# Patient Record
Sex: FEMALE | Race: WHITE | NOT HISPANIC OR LATINO | Employment: FULL TIME | URBAN - METROPOLITAN AREA
[De-identification: names, ages, dates, MRNs, and addresses within clinical notes are randomized per-mention and may not be internally consistent; named-entity substitution may affect disease eponyms.]

---

## 2020-08-17 ENCOUNTER — OFFICE VISIT (OUTPATIENT)
Dept: INTERNAL MEDICINE | Facility: CLINIC | Age: 40
End: 2020-08-17

## 2020-08-17 DIAGNOSIS — Z20.822 SUSPECTED COVID-19 VIRUS INFECTION: Primary | ICD-10-CM

## 2020-08-17 DIAGNOSIS — R50.9 FEVER, UNSPECIFIED FEVER CAUSE: ICD-10-CM

## 2020-08-17 PROBLEM — E66.811 CLASS 1 OBESITY DUE TO EXCESS CALORIES WITH SERIOUS COMORBIDITY AND BODY MASS INDEX (BMI) OF 33.0 TO 33.9 IN ADULT: Status: ACTIVE | Noted: 2019-03-19

## 2020-08-17 PROBLEM — E66.09 CLASS 1 OBESITY DUE TO EXCESS CALORIES WITH SERIOUS COMORBIDITY AND BODY MASS INDEX (BMI) OF 33.0 TO 33.9 IN ADULT: Status: ACTIVE | Noted: 2019-03-19

## 2020-08-17 PROCEDURE — 99203 PR OFFICE/OUTPT VISIT, NEW, LEVL III, 30-44 MIN: ICD-10-PCS | Mod: 95,,, | Performed by: NURSE PRACTITIONER

## 2020-08-17 PROCEDURE — 99203 OFFICE O/P NEW LOW 30 MIN: CPT | Mod: 95,,, | Performed by: NURSE PRACTITIONER

## 2020-08-17 RX ORDER — SERTRALINE HYDROCHLORIDE 100 MG/1
1.5 TABLET, FILM COATED ORAL DAILY
COMMUNITY
Start: 2020-08-07 | End: 2022-11-07 | Stop reason: SDUPTHER

## 2020-08-17 RX ORDER — CLONAZEPAM 0.5 MG/1
0.5 TABLET ORAL 2 TIMES DAILY PRN
COMMUNITY
Start: 2020-04-24 | End: 2022-11-07 | Stop reason: SDUPTHER

## 2020-08-17 RX ORDER — IBUPROFEN 800 MG/1
800 TABLET ORAL 3 TIMES DAILY
Qty: 30 TABLET | Refills: 0 | Status: SHIPPED | OUTPATIENT
Start: 2020-08-17 | End: 2023-05-11 | Stop reason: ALTCHOICE

## 2020-08-17 RX ORDER — LURASIDONE HYDROCHLORIDE 60 MG/1
60 TABLET, FILM COATED ORAL DAILY
COMMUNITY
Start: 2020-07-30 | End: 2022-08-09 | Stop reason: DRUGHIGH

## 2020-08-17 NOTE — PATIENT INSTRUCTIONS
Instructions for Patients with Confirmed or Suspected COVID-19    If you are awaiting your test result, you will either be called or it will be released to the patient portal.  If you have any questions about your test, please visit [email or web address] or call our COVID-19 information line at [phone].      Instructions for non-hospitalized or discharged patients with confirmed or suspected COVID-19:       Stay home except to get medical care.    Separate yourself from other people and animals in your home.    Call ahead before visiting your doctor.    Wear a face mask.    Cover your coughs and sneezes.    Clean your hands often.    Avoid sharing personal household items.    Clean all high-touch surfaces every day.    Monitor your symptoms. Seek prompt medical attention if your illness is worsening (e.g., difficulty breathing). Before seeking care, call your healthcare provider.    If you have a medical emergency and must call 911, notify the dispatcher that you have or are being evaluated for COVID-19. If possible, put on a face mask before emergency medical services arrive.    Use the following symptom-based strategy to return to normal activity following a suspected or confirmed case of COVID-19. Continue isolation until:   o At least 3 days (72 hours) have passed since recovery defined as resolution of fever without the use of fever-reducing medications and improvement in respiratory symptoms (e.g. cough, shortness of breath), and   o At least 10 days have passed since the first positive test.       As one of the next steps, you will receive a call or text from the [place] Department of Health ([facility]) COVID-19 Tracing Team. See the contact information below so you know not to ignore the health departments call. It is important that you contact them back immediately so they can help.     Contact Tracer Number:  [phone]  Caller ID for most carriers: [place] Dept [facility]    What is  contact tracing?   Contact tracing is a process that helps identify everyone who has been in close contact with an infected person. Contact tracers let those people know they may have been exposed and guide them on next steps. Confidentiality is important for everyone; no one will be told who may have exposed them to the virus.   Your involvement is important. The more we know about where and how this virus is spreading, the better chance we have at stopping it from spreading further.  What does exposure mean?   Exposure means you have been within 6 feet for more than 15 minutes with a person who has or had COVID-19.  What kind of questions do the contact tracers ask?   A contact tracer will confirm your basic contact information including name, address, phone number, and next of kin, as well as asking about any symptoms you may have had. Theyll also ask you how you think you may have gotten sick, such as places where you may have been exposed to the virus, and people you were with. Those names will never be shared with anyone outside of that call, and will only be used to help trace and stop the spread of the virus.   I have privacy concerns. How will the state use my information?   Your privacy about your health is important. All calls are completed using call centers that use the appropriate health privacy protection measures (HIPAA compliance), meaning that your patient information is safe. No one will ever ask you any questions related to immigration status. Your health comes first.   Do I have to participate?   You do not have to participate, but we strongly encourage you to. Contact tracing can help us catch and control new outbreaks as theyre developing to keep your friends and family safe.   What if I dont hear from anyone?   If you dont receive a call within 24 hours, you can call the number above right away to inquire about your status. That line is open from 8:00 am - 8:00 p.m., 7 days a  week.  Contact tracing saves lives! Together, we have the power to beat this virus and keep our loved ones and neighbors safe.       Instructions for household members, intimate partners and caregivers in a non-healthcare setting of a patient with confirmed or suspected COVID-19:         Close contacts should monitor their health and call their healthcare provider right away if they develop symptoms suggestive of COVID-19 (e.g., fever, cough, shortness of breath).    Stay home except to get medical care. Separate yourself from other people and animals in the home.   Monitor the patients symptoms. If the patient is getting sicker, call his or her healthcare provider. If the patient has a medical emergency and you need to call 911, notify the dispatch personnel that the patient has or is being evaluated for COVID-19.    Wear a facemask when around other people such as sharing a room or vehicle and before entering a healthcare provider's office.   Cover coughs and sneezes with a tissue. Throw used tissues in a lined trash can immediately and wash hands.   Clean hands often with soap and water for at least 20 seconds or with an alcohol-based hand , rubbing hands together until they feel dry. Avoid touching your eyes, nose, and mouth with unwashed hands.   Clean all high-touch; surfaces every day, including counters, tabletops, doorknobs, bathroom fixtures, toilets, phones, keyboards, tablets, bedside tables, etc. Use a household cleaning spray or wipe according to label instructions.   Avoid sharing personal household items such as dishes, drinking glasses, cups, towels, bedding, etc. After these items are used, they should be washed thoroughly with soap and water.   Continue isolation until:   At least 3 days (72 hours) have passed since recovery defined as resolution of fever without the use of fever-reducing medications and improvement in respiratory symptoms (e.g. cough, shortness of breath),  and    At least 10 days have passed since the patients first positive test.    https://www.cdc.gov/coronavirus/2019-ncov/your-health/index.htm

## 2020-08-17 NOTE — ASSESSMENT & PLAN NOTE
Tylenol/ibuprofen for pain and fever.   Hand hygiene.   Maintain adequate hydration.   Antihistamine and flonase for nasal congestion and upper respiratory symptoms.   Rest.   Offered cough suppressant, pt declined for now. Discussed option for COVID testing, but will not change plan of care so pt opted out. Given COVID precautions.

## 2020-08-17 NOTE — PROGRESS NOTES
Subjective:       Patient ID: Maegan Lafleur is a 39 y.o. female.    Chief Complaint: No chief complaint on file.    The patient location is: [place]  The chief complaint leading to consultation is: Cough, fever, HA    Visit type: audiovisual    Face to Face time with patient: 12 minutes  12 minutes of total time spent on the encounter, which includes face to face time and non-face to face time preparing to see the patient (eg, review of tests), Obtaining and/or reviewing separately obtained history, Documenting clinical information in the electronic or other health record, Independently interpreting results (not separately reported) and communicating results to the patient/family/caregiver, or Care coordination (not separately reported).     Each patient to whom he or she provides medical services by telemedicine is:  (1) informed of the relationship between the physician and patient and the respective role of any other health care provider with respect to management of the patient; and (2) notified that he or she may decline to receive medical services by telemedicine and may withdraw from such care at any time.    Notes:   Mrs. Curtis presents to clinic with concerns for COVID. She started with fever, HA, and cough this morning, and recently exposed to COVID at birthday party this weekend.     Fever   This is a new problem. The current episode started today. The problem occurs constantly. The problem has been gradually worsening. The maximum temperature noted was 100 to 100.9 F. The temperature was taken using an oral thermometer. Associated symptoms include coughing and headaches. Pertinent negatives include no abdominal pain, chest pain, congestion, diarrhea, ear pain, muscle aches, nausea, rash, sleepiness, sore throat, urinary pain, vomiting or wheezing. She has tried fluids for the symptoms. The treatment provided no relief.   Risk factors: sick contacts        Patient Active Problem List    Diagnosis    Chronic anxiety    Class 1 obesity due to excess calories with serious comorbidity and body mass index (BMI) of 33.0 to 33.9 in adult    Severe episode of recurrent major depressive disorder, without psychotic features    Suspected Covid-19 Virus Infection       Family History   Problem Relation Age of Onset    No Known Problems Mother     Diabetes Father      History reviewed. No pertinent surgical history.      Current Outpatient Medications:     clonazePAM (KLONOPIN) 0.5 MG tablet, Take 0.5 mg by mouth 2 (two) times daily as needed., Disp: , Rfl:     lurasidone (LATUDA) 60 mg Tab tablet, Take 60 mg by mouth once daily., Disp: , Rfl:     ibuprofen (ADVIL,MOTRIN) 800 MG tablet, Take 1 tablet (800 mg total) by mouth 3 (three) times daily., Disp: 30 tablet, Rfl: 0    sertraline (ZOLOFT) 100 MG tablet, Take 1.5 mg by mouth Daily., Disp: , Rfl:     Review of Systems   Constitutional: Positive for fatigue and fever. Negative for chills.   HENT: Negative for congestion, ear pain, postnasal drip, rhinorrhea, sinus pressure, sinus pain and sore throat.    Respiratory: Positive for cough. Negative for shortness of breath and wheezing.    Cardiovascular: Negative for chest pain.   Gastrointestinal: Negative for abdominal pain, diarrhea, nausea and vomiting.   Genitourinary: Negative for dysuria, flank pain, frequency and urgency.   Musculoskeletal: Negative for back pain, gait problem and myalgias.   Skin: Negative for rash.   Neurological: Positive for headaches. Negative for dizziness and light-headedness.       Objective:   [facility] 07/06/2020 Comment: irregular     Physical Exam  Constitutional:       General: She is not in acute distress.     Appearance: Normal appearance. She is ill-appearing.   Cardiovascular:      Rate and Rhythm: Normal rate and regular rhythm.      Pulses: Normal pulses.      Heart sounds: Normal heart sounds. No murmur. No friction rub. No gallop.    Pulmonary:      Effort:  "Pulmonary effort is normal. No respiratory distress.      Breath sounds: Normal breath sounds.   Neurological:      Mental Status: She is alert.         Assessment & Plan     Problem List Items Addressed This Visit        Other    Suspected Covid-19 Virus Infection - Primary    Current Assessment & Plan     Tylenol/ibuprofen for pain and fever.   Hand hygiene.   Maintain adequate hydration.   Antihistamine and flonase for nasal congestion and upper respiratory symptoms.   Rest.   Offered cough suppressant, pt declined for now. Discussed option for COVID testing, but will not change plan of care so pt opted out. Given COVID precautions.            Other Visit Diagnoses     Fever, unspecified fever cause        Relevant Medications    ibuprofen (ADVIL,MOTRIN) 800 MG tablet        Follow up if symptoms worsen or fail to improve.            Portions of this note may have been created with voice recognition software. Occasional "wrong-word" or "sound-a-like" substitutions may have occurred due to the inherent limitations of voice recognition software. Please, read the note carefully and recognize, using context, where substitutions have occurred.       "

## 2021-04-29 ENCOUNTER — PATIENT MESSAGE (OUTPATIENT)
Dept: RESEARCH | Facility: HOSPITAL | Age: 41
End: 2021-04-29

## 2021-10-27 ENCOUNTER — HOSPITAL ENCOUNTER (EMERGENCY)
Facility: HOSPITAL | Age: 41
Discharge: HOME | End: 2021-10-27
Attending: EMERGENCY MEDICINE
Payer: MEDICAID

## 2021-10-27 VITALS
DIASTOLIC BLOOD PRESSURE: 87 MMHG | RESPIRATION RATE: 16 BRPM | WEIGHT: 204.13 LBS | OXYGEN SATURATION: 98 % | HEIGHT: 66 IN | TEMPERATURE: 98 F | BODY MASS INDEX: 32.81 KG/M2 | SYSTOLIC BLOOD PRESSURE: 145 MMHG | HEART RATE: 90 BPM

## 2021-10-27 DIAGNOSIS — J06.9 VIRAL URI WITH COUGH: Primary | ICD-10-CM

## 2021-10-27 LAB
CTP QC/QA: YES
GROUP A STREP, MOLECULAR: NEGATIVE
SARS-COV-2 RDRP RESP QL NAA+PROBE: NEGATIVE

## 2021-10-27 PROCEDURE — U0002 COVID-19 LAB TEST NON-CDC: HCPCS | Mod: ER | Performed by: REGISTERED NURSE

## 2021-10-27 PROCEDURE — 87651 STREP A DNA AMP PROBE: CPT | Mod: ER | Performed by: REGISTERED NURSE

## 2021-10-27 PROCEDURE — 99283 EMERGENCY DEPT VISIT LOW MDM: CPT | Mod: 25,ER

## 2021-10-27 RX ORDER — DEXAMETHASONE 4 MG/1
4 TABLET ORAL DAILY
Qty: 4 TABLET | Refills: 0 | Status: SHIPPED | OUTPATIENT
Start: 2021-10-27 | End: 2021-10-31

## 2021-12-17 ENCOUNTER — HOSPITAL ENCOUNTER (EMERGENCY)
Facility: HOSPITAL | Age: 41
Discharge: HOME | End: 2021-12-17
Attending: EMERGENCY MEDICINE
Payer: MEDICAID

## 2021-12-17 VITALS
WEIGHT: 200.19 LBS | TEMPERATURE: 100 F | BODY MASS INDEX: 32.31 KG/M2 | RESPIRATION RATE: 18 BRPM | SYSTOLIC BLOOD PRESSURE: 141 MMHG | HEART RATE: 116 BPM | DIASTOLIC BLOOD PRESSURE: 70 MMHG | OXYGEN SATURATION: 96 %

## 2021-12-17 DIAGNOSIS — B34.9 VIRAL ILLNESS: Primary | ICD-10-CM

## 2021-12-17 LAB
CTP QC/QA: YES
CTP QC/QA: YES
GROUP A STREP, MOLECULAR: NEGATIVE
POC MOLECULAR INFLUENZA A AGN: NEGATIVE
POC MOLECULAR INFLUENZA B AGN: NEGATIVE
SARS-COV-2 RDRP RESP QL NAA+PROBE: NEGATIVE

## 2021-12-17 PROCEDURE — 87651 STREP A DNA AMP PROBE: CPT | Mod: ER | Performed by: NURSE PRACTITIONER

## 2021-12-17 PROCEDURE — U0002 COVID-19 LAB TEST NON-CDC: HCPCS | Mod: ER | Performed by: NURSE PRACTITIONER

## 2021-12-17 PROCEDURE — 99282 EMERGENCY DEPT VISIT SF MDM: CPT | Mod: 25,ER

## 2022-07-05 ENCOUNTER — HOSPITAL ENCOUNTER (EMERGENCY)
Facility: HOSPITAL | Age: 42
Discharge: HOME | End: 2022-07-05
Attending: EMERGENCY MEDICINE
Payer: MEDICAID

## 2022-07-05 VITALS
SYSTOLIC BLOOD PRESSURE: 132 MMHG | HEART RATE: 95 BPM | OXYGEN SATURATION: 98 % | WEIGHT: 203.63 LBS | TEMPERATURE: 99 F | DIASTOLIC BLOOD PRESSURE: 87 MMHG | RESPIRATION RATE: 18 BRPM | HEIGHT: 66 IN | BODY MASS INDEX: 32.72 KG/M2

## 2022-07-05 DIAGNOSIS — Z20.822 COVID-19 VIRUS NOT DETECTED: Primary | ICD-10-CM

## 2022-07-05 LAB
CTP QC/QA: YES
SARS-COV-2 RDRP RESP QL NAA+PROBE: NEGATIVE

## 2022-07-05 PROCEDURE — 99282 EMERGENCY DEPT VISIT SF MDM: CPT | Mod: ER

## 2022-07-05 PROCEDURE — U0002 COVID-19 LAB TEST NON-CDC: HCPCS | Mod: ER | Performed by: EMERGENCY MEDICINE

## 2022-07-05 NOTE — ED PROVIDER NOTES
Encounter Date: 7/5/2022       History     Chief Complaint   Patient presents with    COVID-19 Concerns     C/o spouse in same household being COVID+. Denies symptoms.      Maegan Lafleur is a 41 y.o. female who presents for screening for COVID-19 for work, which is a  facility, in the setting of recent exposure to her  who has COVID-19.     Problem onset: now/upon arrival  Severity: minimal  Setting: at home  Associated symptoms: none         Review of patient's allergies indicates:   Allergen Reactions    Escitalopram oxalate Swelling     No past medical history on file.  No past surgical history on file.  Family History   Problem Relation Age of Onset    No Known Problems Mother     Diabetes Father      Social History     Tobacco Use    Smoking status: Never Smoker    Smokeless tobacco: Never Used   Substance Use Topics    Alcohol use: Not Currently    Drug use: Not Currently     Review of Systems   Constitutional: Negative.  Negative for fever.   HENT: Negative.    Eyes: Negative.    Respiratory: Negative.    Cardiovascular: Negative.    Gastrointestinal: Negative.    Endocrine: Negative.    Genitourinary: Negative.    Musculoskeletal: Negative.    Skin: Negative.    Allergic/Immunologic: Negative.    Neurological: Negative.    Hematological: Negative.    Psychiatric/Behavioral: Negative.    All other systems reviewed and are negative.      Physical Exam     Initial Vitals [07/05/22 0244]   BP Pulse Resp Temp SpO2   132/87 95 18 98.8 °F (37.1 °C) 98 %      MAP       --         Physical Exam    Nursing note and vitals reviewed.  Constitutional: She appears well-developed and well-nourished. No distress.   HENT:   Head: Normocephalic and atraumatic.   Eyes: Conjunctivae and EOM are normal.   Neck: Neck supple.   Cardiovascular: Normal rate.   Pulmonary/Chest: No respiratory distress.   Abdominal: She exhibits no distension.   Musculoskeletal:         General: Normal range of motion.       Cervical back: Neck supple.     Neurological: She is alert and oriented to person, place, and time. She has normal strength. GCS score is 15. GCS eye subscore is 4. GCS verbal subscore is 5. GCS motor subscore is 6.   Skin: No rash noted.   Psychiatric: She has a normal mood and affect.         ED Course   Procedures  Labs Reviewed   SARS-COV-2 RDRP GENE    Narrative:     This test utilizes isothermal nucleic acid amplification   technology to detect the SARS-CoV-2 RdRp nucleic acid segment.   The analytical sensitivity (limit of detection) is 125 genome   equivalents/mL.   A POSITIVE result implies infection with the SARS-CoV-2 virus;   the patient is presumed to be contagious.     A NEGATIVE result means that SARS-CoV-2 nucleic acids are not   present above the limit of detection. A NEGATIVE result should be   treated as presumptive. It does not rule out the possibility of   COVID-19 and should not be the sole basis for treatment decisions.   If COVID-19 is strongly suspected based on clinical and exposure   history, re-testing using an alternate molecular assay should be   considered.   This test is only for use under the Food and Drug   Administration s Emergency Use Authorization (EUA).   Commercial kits are provided by [employer].   Performance characteristics of the EUA have been independently   verified by [facility] Department of   Pathology and Laboratory Medicine.   _________________________________________________________________   The authorized Fact Sheet for Healthcare Providers and the authorized Fact   Sheet for Patients of the ID NOW COVID-19 are available on the FDA   website:     https://www.fda.gov/media/486920/download  https://www.fda.gov/media/713727/download                  Imaging Results    None          Medications - No data to display                  Recent Results (from the past 24 hour(s))   POCT COVID-19 Rapid Screening    Collection Time: 07/05/22  3:06 AM    Result Value Ref Range    POC Rapid COVID Negative Negative     Acceptable Yes        Patient's evaluation in the ED does not suggest any emergent or life threatening medical conditions requiring immediate intervention beyond what was provided in the ED, and I believe patient is safe for discharge.  Regardless, an unremarkable evaluation in the ED does not preclude the development or presence of a serious or life threatening condition. As such, patient was given return instructions for any change or worsening in symptoms.         Clinical Impression:   Final diagnoses:  [Z20.822] COVID-19 virus not detected (Primary)          ED Disposition Condition    Discharge Stable        ED Prescriptions     None        Follow-up Information     Follow up With Specialties Details Why Contact Info    Cam Dowd MD Internal Medicine Schedule an appointment as soon as possible for a visit  As needed [id number] [facility]  [place]   [phone]             Ronal Cartagena MD  07/05/22 1752

## 2022-08-09 ENCOUNTER — OFFICE VISIT (OUTPATIENT)
Dept: INTERNAL MEDICINE | Facility: CLINIC | Age: 42
End: 2022-08-09
Payer: MEDICAID

## 2022-08-09 ENCOUNTER — LAB VISIT (OUTPATIENT)
Dept: LAB | Facility: HOSPITAL | Age: 42
End: 2022-08-09
Attending: NURSE PRACTITIONER
Payer: MEDICAID

## 2022-08-09 VITALS
WEIGHT: 206.38 LBS | TEMPERATURE: 98 F | HEIGHT: 66 IN | BODY MASS INDEX: 33.17 KG/M2 | DIASTOLIC BLOOD PRESSURE: 90 MMHG | HEART RATE: 116 BPM | SYSTOLIC BLOOD PRESSURE: 140 MMHG | OXYGEN SATURATION: 96 %

## 2022-08-09 DIAGNOSIS — F31.4 BIPOLAR DISORDER WITH SEVERE DEPRESSION: Primary | ICD-10-CM

## 2022-08-09 DIAGNOSIS — Z13.220 LIPID SCREENING: ICD-10-CM

## 2022-08-09 DIAGNOSIS — F33.2 SEVERE EPISODE OF RECURRENT MAJOR DEPRESSIVE DISORDER, WITHOUT PSYCHOTIC FEATURES: ICD-10-CM

## 2022-08-09 DIAGNOSIS — L65.9 HAIR THINNING: ICD-10-CM

## 2022-08-09 DIAGNOSIS — E66.09 CLASS 1 OBESITY DUE TO EXCESS CALORIES WITH SERIOUS COMORBIDITY AND BODY MASS INDEX (BMI) OF 33.0 TO 33.9 IN ADULT: ICD-10-CM

## 2022-08-09 DIAGNOSIS — R53.83 FATIGUE, UNSPECIFIED TYPE: ICD-10-CM

## 2022-08-09 DIAGNOSIS — R63.5 WEIGHT GAIN: ICD-10-CM

## 2022-08-09 DIAGNOSIS — F41.9 CHRONIC ANXIETY: ICD-10-CM

## 2022-08-09 LAB
ALBUMIN SERPL BCP-MCNC: 4 G/DL (ref 3.5–5.2)
ALP SERPL-CCNC: 100 U/L (ref 55–135)
ALT SERPL W/O P-5'-P-CCNC: 56 U/L (ref 10–44)
ANION GAP SERPL CALC-SCNC: 7 MMOL/L (ref 8–16)
AST SERPL-CCNC: 32 U/L (ref 10–40)
BASOPHILS # BLD AUTO: 0.06 K/UL (ref 0–0.2)
BASOPHILS NFR BLD: 0.6 % (ref 0–1.9)
BILIRUB SERPL-MCNC: 0.3 MG/DL (ref 0.1–1)
BUN SERPL-MCNC: 12 MG/DL (ref 6–20)
CALCIUM SERPL-MCNC: 10.1 MG/DL (ref 8.7–10.5)
CHLORIDE SERPL-SCNC: 101 MMOL/L (ref 95–110)
CO2 SERPL-SCNC: 30 MMOL/L (ref 23–29)
CREAT SERPL-MCNC: 0.9 MG/DL (ref 0.5–1.4)
DIFFERENTIAL METHOD: ABNORMAL
EOSINOPHIL # BLD AUTO: 0.3 K/UL (ref 0–0.5)
EOSINOPHIL NFR BLD: 2.5 % (ref 0–8)
ERYTHROCYTE [DISTWIDTH] IN BLOOD BY AUTOMATED COUNT: 14.8 % (ref 11.5–14.5)
EST. GFR  (NO RACE VARIABLE): >60 ML/MIN/1.73 M^2
GLUCOSE SERPL-MCNC: 129 MG/DL (ref 70–110)
HCT VFR BLD AUTO: 36.9 % (ref 37–48.5)
HGB BLD-MCNC: 12 G/DL (ref 12–16)
IMM GRANULOCYTES # BLD AUTO: 0.04 K/UL (ref 0–0.04)
IMM GRANULOCYTES NFR BLD AUTO: 0.4 % (ref 0–0.5)
LYMPHOCYTES # BLD AUTO: 3 K/UL (ref 1–4.8)
LYMPHOCYTES NFR BLD: 28.1 % (ref 18–48)
MCH RBC QN AUTO: 26.3 PG (ref 27–31)
MCHC RBC AUTO-ENTMCNC: 32.5 G/DL (ref 32–36)
MCV RBC AUTO: 81 FL (ref 82–98)
MONOCYTES # BLD AUTO: 0.6 K/UL (ref 0.3–1)
MONOCYTES NFR BLD: 5.7 % (ref 4–15)
NEUTROPHILS # BLD AUTO: 6.7 K/UL (ref 1.8–7.7)
NEUTROPHILS NFR BLD: 62.7 % (ref 38–73)
NRBC BLD-RTO: 0 /100 WBC
PLATELET # BLD AUTO: 477 K/UL (ref 150–450)
PMV BLD AUTO: 8.2 FL (ref 9.2–12.9)
POTASSIUM SERPL-SCNC: 3.8 MMOL/L (ref 3.5–5.1)
PROT SERPL-MCNC: 8.1 G/DL (ref 6–8.4)
RBC # BLD AUTO: 4.57 M/UL (ref 4–5.4)
SODIUM SERPL-SCNC: 138 MMOL/L (ref 136–145)
TSH SERPL DL<=0.005 MIU/L-ACNC: 1.9 UIU/ML (ref 0.4–4)
WBC # BLD AUTO: 10.6 K/UL (ref 3.9–12.7)

## 2022-08-09 PROCEDURE — 99214 PR OFFICE/OUTPT VISIT, EST, LEVL IV, 30-39 MIN: ICD-10-PCS | Mod: S$PBB,,, | Performed by: NURSE PRACTITIONER

## 2022-08-09 PROCEDURE — 99214 OFFICE O/P EST MOD 30 MIN: CPT | Mod: S$PBB,,, | Performed by: NURSE PRACTITIONER

## 2022-08-09 PROCEDURE — 3044F PR MOST RECENT HEMOGLOBIN A1C LEVEL <7.0%: ICD-10-PCS | Mod: CPTII,,, | Performed by: NURSE PRACTITIONER

## 2022-08-09 PROCEDURE — 3080F PR MOST RECENT DIASTOLIC BLOOD PRESSURE >= 90 MM HG: ICD-10-PCS | Mod: CPTII,,, | Performed by: NURSE PRACTITIONER

## 2022-08-09 PROCEDURE — 3008F PR BODY MASS INDEX (BMI) DOCUMENTED: ICD-10-PCS | Mod: CPTII,,, | Performed by: NURSE PRACTITIONER

## 2022-08-09 PROCEDURE — 99999 PR PBB SHADOW E&M-EST. PATIENT-LVL III: ICD-10-PCS | Mod: PBBFAC,,, | Performed by: NURSE PRACTITIONER

## 2022-08-09 PROCEDURE — 3044F HG A1C LEVEL LT 7.0%: CPT | Mod: CPTII,,, | Performed by: NURSE PRACTITIONER

## 2022-08-09 PROCEDURE — 99999 PR PBB SHADOW E&M-EST. PATIENT-LVL III: CPT | Mod: PBBFAC,,, | Performed by: NURSE PRACTITIONER

## 2022-08-09 PROCEDURE — 83036 HEMOGLOBIN GLYCOSYLATED A1C: CPT | Performed by: NURSE PRACTITIONER

## 2022-08-09 PROCEDURE — 80061 LIPID PANEL: CPT | Performed by: NURSE PRACTITIONER

## 2022-08-09 PROCEDURE — 80053 COMPREHEN METABOLIC PANEL: CPT | Mod: PO | Performed by: NURSE PRACTITIONER

## 2022-08-09 PROCEDURE — 85025 COMPLETE CBC W/AUTO DIFF WBC: CPT | Mod: PO | Performed by: NURSE PRACTITIONER

## 2022-08-09 PROCEDURE — 3008F BODY MASS INDEX DOCD: CPT | Mod: CPTII,,, | Performed by: NURSE PRACTITIONER

## 2022-08-09 PROCEDURE — 3080F DIAST BP >= 90 MM HG: CPT | Mod: CPTII,,, | Performed by: NURSE PRACTITIONER

## 2022-08-09 PROCEDURE — 36415 COLL VENOUS BLD VENIPUNCTURE: CPT | Mod: PO | Performed by: NURSE PRACTITIONER

## 2022-08-09 PROCEDURE — 3077F PR MOST RECENT SYSTOLIC BLOOD PRESSURE >= 140 MM HG: ICD-10-PCS | Mod: CPTII,,, | Performed by: NURSE PRACTITIONER

## 2022-08-09 PROCEDURE — 3077F SYST BP >= 140 MM HG: CPT | Mod: CPTII,,, | Performed by: NURSE PRACTITIONER

## 2022-08-09 PROCEDURE — 99213 OFFICE O/P EST LOW 20 MIN: CPT | Mod: PBBFAC,PO | Performed by: NURSE PRACTITIONER

## 2022-08-09 PROCEDURE — 84443 ASSAY THYROID STIM HORMONE: CPT | Mod: PO | Performed by: NURSE PRACTITIONER

## 2022-08-09 RX ORDER — LURASIDONE HYDROCHLORIDE 20 MG/1
20 TABLET, FILM COATED ORAL DAILY
Qty: 30 TABLET | Refills: 5 | Status: SHIPPED | OUTPATIENT
Start: 2022-08-09 | End: 2022-08-24

## 2022-08-09 RX ORDER — LURASIDONE HYDROCHLORIDE 20 MG/1
1 TABLET, FILM COATED ORAL DAILY
COMMUNITY
Start: 2021-09-24 | End: 2022-08-09 | Stop reason: SDUPTHER

## 2022-08-09 NOTE — PROGRESS NOTES
Subjective:       Patient ID: Maegan Lafleur is a 41 y.o. female.    Chief Complaint: Depression    Mrs. Lafleur presents to visit to establish care and for complaint of depression/bipoar. Currently on latuda and zoloft. Previously managed by Dr Dowd. Previously was on latuda, felt that it worked in the past, but recently when started again did not feel that it works as well. Recently went to Dr. Archer with [facility]. Had nausea with 20 mg of latuda so has only been taking 0.5 tablet. Previous records reviewed, noted that she was on 40 mg latuda in 2018. During bipolar episodes, reports that she spends money in excess to extent that  has to take debit/credit cards away from her. Has been tearful and crying. No desire to do things. Also expresses concerns of weight gain, hair thinning, and general fatigue.     risperadol: made symptoms worse  Abilify: psychotic thoughts  Prozac: did not work  Seroquel: unknown issues      Also has been having urinary incontinence with sneezing and urgency for 2+ years. Saw GYN last year.       Patient Active Problem List   Diagnosis    Chronic anxiety    Class 1 obesity due to excess calories with serious comorbidity and body mass index (BMI) of 33.0 to 33.9 in adult    Severe episode of recurrent major depressive disorder, without psychotic features    Bipolar disorder with severe depression    Fatigue       Family History   Problem Relation Age of Onset    No Known Problems Mother     Diabetes Father      History reviewed. No pertinent surgical history.        Review of Systems   Constitutional:  Positive for activity change and fatigue. Negative for appetite change, chills and fever.   Eyes:  Negative for visual disturbance.   Respiratory:  Negative for shortness of breath.    Cardiovascular:  Negative for chest pain and palpitations.   Gastrointestinal:  Negative for abdominal pain, constipation, diarrhea, nausea and vomiting.   Genitourinary:  Negative for  "frequency and urgency.        Urge/stress incontinence   Neurological:  Negative for dizziness, light-headedness and headaches.   Psychiatric/Behavioral:  Positive for agitation, decreased concentration and dysphoric mood. Negative for self-injury and suicidal ideas. The patient is nervous/anxious.      Objective:   BP (!) 140/90 (BP Location: Left arm, Patient Position: Sitting, BP Method: Medium (Manual))   Pulse (!) 116   Temp 97.7 °F (36.5 °C) (Temporal)   Ht 5' 6" (1.676 m)   Wt 93.6 kg (206 lb 5.6 oz)   LMP 08/07/2022   SpO2 96%   BMI 33.31 kg/m²      Physical Exam  Constitutional:       General: She is not in acute distress.     Appearance: Normal appearance. She is obese. She is not ill-appearing.   Cardiovascular:      Rate and Rhythm: Regular rhythm. Tachycardia present.      Pulses: Normal pulses.      Heart sounds: Normal heart sounds. No murmur heard.    No friction rub. No gallop.   Pulmonary:      Effort: Pulmonary effort is normal. No respiratory distress.      Breath sounds: Normal breath sounds.   Abdominal:      Palpations: Abdomen is soft.      Tenderness: There is no abdominal tenderness. There is no guarding.   Skin:     General: Skin is warm and dry.      Coloration: Skin is not pale.      Findings: No erythema.   Neurological:      Mental Status: She is alert and oriented to person, place, and time.   Psychiatric:         Mood and Affect: Mood is anxious and depressed. Affect is tearful.         Speech: Speech normal.         Behavior: Behavior normal. Behavior is cooperative.         Thought Content: Thought content normal.       Assessment & Plan     Problem List Items Addressed This Visit          Psychiatric    Chronic anxiety    Current Assessment & Plan     Continue zoloft. Klonopin prn.          Severe episode of recurrent major depressive disorder, without psychotic features    Current Assessment & Plan     Trying to increase latuda for now. Concerns about cost or coverage " "since she has been receiving sample from psychiatry. Follow up in two weeks.          Bipolar disorder with severe depression - Primary    Current Assessment & Plan     Trying to increase latuda for now. Concerns about cost or coverage since she has been receiving sample from psychiatry. Follow up in two weeks.             Endocrine    Class 1 obesity due to excess calories with serious comorbidity and body mass index (BMI) of 33.0 to 33.9 in adult    Current Assessment & Plan     Counseled on importance of diet and exercise in order to improve overall quality of life, and reduce risk of future comorbidities.           Relevant Orders    TSH (Completed)    Hemoglobin A1C (Completed)       Other    Fatigue    Current Assessment & Plan     No concerning findings on physical exam outside of depression. Will do labs today.          Relevant Orders    CBC Auto Differential (Completed)    COMPREHENSIVE METABOLIC PANEL (Completed)    TSH (Completed)     Other Visit Diagnoses       Hair thinning        Relevant Orders    TSH (Completed)    Weight gain        Lipid screening        Relevant Orders    Lipid Panel (Completed)        trial of increasing latuda since she was previously able to titrate up to 40 mg with improvement of bipolar symptoms. Routine labs today. No concerning findings on physical exam.       Follow up in about 2 weeks (around 8/23/2022) for hypertension.            Portions of this note may have been created with voice recognition software. Occasional "wrong-word" or "sound-a-like" substitutions may have occurred due to the inherent limitations of voice recognition software. Please, read the note carefully and recognize, using context, where substitutions have occurred.       "

## 2022-08-10 LAB
CHOLEST SERPL-MCNC: 238 MG/DL (ref 120–199)
CHOLEST/HDLC SERPL: 4.3 {RATIO} (ref 2–5)
ESTIMATED AVG GLUCOSE: 117 MG/DL (ref 68–131)
HBA1C MFR BLD: 5.7 % (ref 4–5.6)
HDLC SERPL-MCNC: 55 MG/DL (ref 40–75)
HDLC SERPL: 23.1 % (ref 20–50)
LDLC SERPL CALC-MCNC: 143.8 MG/DL (ref 63–159)
NONHDLC SERPL-MCNC: 183 MG/DL
TRIGL SERPL-MCNC: 196 MG/DL (ref 30–150)

## 2022-08-15 ENCOUNTER — PATIENT MESSAGE (OUTPATIENT)
Dept: INTERNAL MEDICINE | Facility: CLINIC | Age: 42
End: 2022-08-15
Payer: MEDICAID

## 2022-08-23 ENCOUNTER — PATIENT MESSAGE (OUTPATIENT)
Dept: INTERNAL MEDICINE | Facility: CLINIC | Age: 42
End: 2022-08-23
Payer: MEDICAID

## 2022-08-24 ENCOUNTER — PATIENT MESSAGE (OUTPATIENT)
Dept: INTERNAL MEDICINE | Facility: CLINIC | Age: 42
End: 2022-08-24
Payer: MEDICAID

## 2022-08-24 RX ORDER — CARIPRAZINE 1.5 MG/1
1.5 CAPSULE, GELATIN COATED ORAL DAILY
Qty: 30 CAPSULE | Refills: 0 | Status: SHIPPED | OUTPATIENT
Start: 2022-08-24 | End: 2022-08-29

## 2022-08-28 PROBLEM — Z20.822 SUSPECTED COVID-19 VIRUS INFECTION: Status: RESOLVED | Noted: 2020-08-17 | Resolved: 2022-08-28

## 2022-08-28 PROBLEM — R53.83 FATIGUE: Status: ACTIVE | Noted: 2022-08-28

## 2022-08-28 PROBLEM — F31.4 BIPOLAR DISORDER WITH SEVERE DEPRESSION: Status: ACTIVE | Noted: 2022-08-28

## 2022-08-29 ENCOUNTER — OFFICE VISIT (OUTPATIENT)
Dept: INTERNAL MEDICINE | Facility: CLINIC | Age: 42
End: 2022-08-29
Payer: MEDICAID

## 2022-08-29 VITALS
HEART RATE: 95 BPM | OXYGEN SATURATION: 96 % | SYSTOLIC BLOOD PRESSURE: 130 MMHG | DIASTOLIC BLOOD PRESSURE: 78 MMHG | BODY MASS INDEX: 33.2 KG/M2 | WEIGHT: 205.69 LBS | TEMPERATURE: 97 F

## 2022-08-29 DIAGNOSIS — F33.2 SEVERE EPISODE OF RECURRENT MAJOR DEPRESSIVE DISORDER, WITHOUT PSYCHOTIC FEATURES: ICD-10-CM

## 2022-08-29 DIAGNOSIS — F41.9 CHRONIC ANXIETY: ICD-10-CM

## 2022-08-29 DIAGNOSIS — R73.03 PREDIABETES: ICD-10-CM

## 2022-08-29 DIAGNOSIS — F31.4 BIPOLAR DISORDER WITH SEVERE DEPRESSION: Primary | ICD-10-CM

## 2022-08-29 PROCEDURE — 99214 OFFICE O/P EST MOD 30 MIN: CPT | Mod: S$PBB,,, | Performed by: NURSE PRACTITIONER

## 2022-08-29 PROCEDURE — 3075F PR MOST RECENT SYSTOLIC BLOOD PRESS GE 130-139MM HG: ICD-10-PCS | Mod: CPTII,,, | Performed by: NURSE PRACTITIONER

## 2022-08-29 PROCEDURE — 3008F BODY MASS INDEX DOCD: CPT | Mod: CPTII,,, | Performed by: NURSE PRACTITIONER

## 2022-08-29 PROCEDURE — 3078F DIAST BP <80 MM HG: CPT | Mod: CPTII,,, | Performed by: NURSE PRACTITIONER

## 2022-08-29 PROCEDURE — 99999 PR PBB SHADOW E&M-EST. PATIENT-LVL III: CPT | Mod: PBBFAC,,, | Performed by: NURSE PRACTITIONER

## 2022-08-29 PROCEDURE — 1160F PR REVIEW ALL MEDS BY PRESCRIBER/CLIN PHARMACIST DOCUMENTED: ICD-10-PCS | Mod: CPTII,,, | Performed by: NURSE PRACTITIONER

## 2022-08-29 PROCEDURE — 1159F MED LIST DOCD IN RCRD: CPT | Mod: CPTII,,, | Performed by: NURSE PRACTITIONER

## 2022-08-29 PROCEDURE — 3008F PR BODY MASS INDEX (BMI) DOCUMENTED: ICD-10-PCS | Mod: CPTII,,, | Performed by: NURSE PRACTITIONER

## 2022-08-29 PROCEDURE — 3078F PR MOST RECENT DIASTOLIC BLOOD PRESSURE < 80 MM HG: ICD-10-PCS | Mod: CPTII,,, | Performed by: NURSE PRACTITIONER

## 2022-08-29 PROCEDURE — 3044F PR MOST RECENT HEMOGLOBIN A1C LEVEL <7.0%: ICD-10-PCS | Mod: CPTII,,, | Performed by: NURSE PRACTITIONER

## 2022-08-29 PROCEDURE — 3075F SYST BP GE 130 - 139MM HG: CPT | Mod: CPTII,,, | Performed by: NURSE PRACTITIONER

## 2022-08-29 PROCEDURE — 99213 OFFICE O/P EST LOW 20 MIN: CPT | Mod: PBBFAC,PO | Performed by: NURSE PRACTITIONER

## 2022-08-29 PROCEDURE — 1160F RVW MEDS BY RX/DR IN RCRD: CPT | Mod: CPTII,,, | Performed by: NURSE PRACTITIONER

## 2022-08-29 PROCEDURE — 1159F PR MEDICATION LIST DOCUMENTED IN MEDICAL RECORD: ICD-10-PCS | Mod: CPTII,,, | Performed by: NURSE PRACTITIONER

## 2022-08-29 PROCEDURE — 99999 PR PBB SHADOW E&M-EST. PATIENT-LVL III: ICD-10-PCS | Mod: PBBFAC,,, | Performed by: NURSE PRACTITIONER

## 2022-08-29 PROCEDURE — 3044F HG A1C LEVEL LT 7.0%: CPT | Mod: CPTII,,, | Performed by: NURSE PRACTITIONER

## 2022-08-29 PROCEDURE — 99214 PR OFFICE/OUTPT VISIT, EST, LEVL IV, 30-39 MIN: ICD-10-PCS | Mod: S$PBB,,, | Performed by: NURSE PRACTITIONER

## 2022-08-29 RX ORDER — CARIPRAZINE 1.5 MG/1
1.5 CAPSULE, GELATIN COATED ORAL DAILY
Qty: 30 CAPSULE | Refills: 0 | Status: SHIPPED | OUTPATIENT
Start: 2022-08-29 | End: 2022-11-02

## 2022-08-29 NOTE — ASSESSMENT & PLAN NOTE
Trying to increase latuda for now. Concerns about cost or coverage since she has been receiving sample from psychiatry. Follow up in two weeks.

## 2022-08-29 NOTE — PROGRESS NOTES
Subjective:       Patient ID: Maegan Lafleur is a 41 y.o. female.    Chief Complaint: Follow-up and Depression    Mrs Lafleur presents to visit for depression follow up. Has not had any improvement in depression recently, but also has not been able to get latuda approved since last visit. Changed rx to vraylar, but patient was not aware that it was at pharmacy. She reports that she has been having significant crying episodes and not wanting to get out of house. Has tried to start exercising to help with mood.     Depression  Visit Type: follow-up  Patient presents with the following symptoms: anhedonia, decreased concentration, depressed mood, excessive worry, fatigue, feelings of hopelessness, irritability, malaise and nervousness/anxiety.  Patient is not experiencing: palpitations, shortness of breath and suicidal ideas.   Severity: interfering with daily activities       Patient Active Problem List   Diagnosis    Chronic anxiety    Class 1 obesity due to excess calories with serious comorbidity and body mass index (BMI) of 33.0 to 33.9 in adult    Severe episode of recurrent major depressive disorder, without psychotic features    Bipolar disorder with severe depression    Fatigue    Prediabetes       Family History   Problem Relation Age of Onset    No Known Problems Mother     Diabetes Father      History reviewed. No pertinent surgical history.      Current Outpatient Medications:     clonazePAM (KLONOPIN) 0.5 MG tablet, Take 0.5 mg by mouth 2 (two) times daily as needed., Disp: , Rfl:     ibuprofen (ADVIL,MOTRIN) 800 MG tablet, Take 1 tablet (800 mg total) by mouth 3 (three) times daily., Disp: 30 tablet, Rfl: 0    sertraline (ZOLOFT) 100 MG tablet, Take 1.5 mg by mouth Daily., Disp: , Rfl:     cariprazine (VRAYLAR) 1.5 mg Cap, Take 1 capsule (1.5 mg total) by mouth once daily., Disp: 30 capsule, Rfl: 0    Review of Systems   Constitutional:  Positive for activity change, fatigue and irritability.  Negative for appetite change, chills and fever.   Eyes:  Negative for visual disturbance.   Respiratory:  Negative for shortness of breath.    Cardiovascular:  Negative for chest pain and palpitations.   Gastrointestinal:  Negative for abdominal pain, constipation, diarrhea, nausea and vomiting.   Genitourinary:  Negative for frequency and urgency.   Neurological:  Negative for dizziness, light-headedness and headaches.   Psychiatric/Behavioral:  Positive for agitation, decreased concentration, depression and dysphoric mood. Negative for self-injury and suicidal ideas. The patient is nervous/anxious.      Objective:   /78 (BP Location: Left arm, Patient Position: Sitting)   Pulse 95   Temp 97.2 °F (36.2 °C)   Wt 93.3 kg (205 lb 11 oz)   LMP 08/07/2022   SpO2 96%   BMI 33.20 kg/m²      Physical Exam  Constitutional:       General: She is not in acute distress.     Appearance: Normal appearance. She is obese. She is not ill-appearing.   Cardiovascular:      Rate and Rhythm: Normal rate and regular rhythm.      Pulses: Normal pulses.      Heart sounds: Normal heart sounds. No murmur heard.    No friction rub. No gallop.   Pulmonary:      Effort: Pulmonary effort is normal. No respiratory distress.      Breath sounds: Normal breath sounds.   Skin:     General: Skin is warm and dry.      Coloration: Skin is not pale.      Findings: No erythema.   Neurological:      Mental Status: She is alert and oriented to person, place, and time.   Psychiatric:         Mood and Affect: Mood is anxious and depressed. Affect is tearful.         Speech: Speech normal.         Behavior: Behavior normal. Behavior is cooperative.         Thought Content: Thought content does not include suicidal ideation. Thought content does not include suicidal plan.       Assessment & Plan     Problem List Items Addressed This Visit          Psychiatric    Chronic anxiety    Current Assessment & Plan     Continue zoloft. Klonopin prn.          "Severe episode of recurrent major depressive disorder, without psychotic features    Current Assessment & Plan     Not able to get latuda covered. Starting vraylar.          Bipolar disorder with severe depression - Primary    Current Assessment & Plan     Continue zoloft for now. Adding vraylar.          Relevant Medications    cariprazine (VRAYLAR) 1.5 mg Cap       Endocrine    Prediabetes    Current Assessment & Plan     Discussed dietary change. May try ozempic in future once depression/bipolar medication is stable. So we are not adjusting multiple medications at the same time.             Follow up in about 1 month (around 9/29/2022) for depression. .          Portions of this note may have been created with voice recognition software. Occasional "wrong-word" or "sound-a-like" substitutions may have occurred due to the inherent limitations of voice recognition software. Please, read the note carefully and recognize, using context, where substitutions have occurred.         "

## 2022-09-16 NOTE — ASSESSMENT & PLAN NOTE
Discussed dietary change. May try ozempic in future once depression/bipolar medication is stable. So we are not adjusting multiple medications at the same time.

## 2022-11-07 ENCOUNTER — OFFICE VISIT (OUTPATIENT)
Dept: INTERNAL MEDICINE | Facility: CLINIC | Age: 42
End: 2022-11-07
Payer: MEDICAID

## 2022-11-07 VITALS
OXYGEN SATURATION: 96 % | DIASTOLIC BLOOD PRESSURE: 78 MMHG | HEIGHT: 66 IN | HEART RATE: 114 BPM | TEMPERATURE: 99 F | BODY MASS INDEX: 32.95 KG/M2 | SYSTOLIC BLOOD PRESSURE: 130 MMHG | WEIGHT: 205 LBS

## 2022-11-07 DIAGNOSIS — E66.09 CLASS 1 OBESITY DUE TO EXCESS CALORIES WITH SERIOUS COMORBIDITY AND BODY MASS INDEX (BMI) OF 33.0 TO 33.9 IN ADULT: ICD-10-CM

## 2022-11-07 DIAGNOSIS — F41.9 CHRONIC ANXIETY: Primary | ICD-10-CM

## 2022-11-07 DIAGNOSIS — F31.4 BIPOLAR DISORDER WITH SEVERE DEPRESSION: ICD-10-CM

## 2022-11-07 DIAGNOSIS — F33.2 SEVERE EPISODE OF RECURRENT MAJOR DEPRESSIVE DISORDER, WITHOUT PSYCHOTIC FEATURES: ICD-10-CM

## 2022-11-07 PROCEDURE — 1159F MED LIST DOCD IN RCRD: CPT | Mod: CPTII,,, | Performed by: NURSE PRACTITIONER

## 2022-11-07 PROCEDURE — 1160F RVW MEDS BY RX/DR IN RCRD: CPT | Mod: CPTII,,, | Performed by: NURSE PRACTITIONER

## 2022-11-07 PROCEDURE — 99214 OFFICE O/P EST MOD 30 MIN: CPT | Mod: S$PBB,,, | Performed by: NURSE PRACTITIONER

## 2022-11-07 PROCEDURE — 3044F HG A1C LEVEL LT 7.0%: CPT | Mod: CPTII,,, | Performed by: NURSE PRACTITIONER

## 2022-11-07 PROCEDURE — 99214 OFFICE O/P EST MOD 30 MIN: CPT | Mod: PBBFAC,PO | Performed by: NURSE PRACTITIONER

## 2022-11-07 PROCEDURE — 3078F PR MOST RECENT DIASTOLIC BLOOD PRESSURE < 80 MM HG: ICD-10-PCS | Mod: CPTII,,, | Performed by: NURSE PRACTITIONER

## 2022-11-07 PROCEDURE — 3075F SYST BP GE 130 - 139MM HG: CPT | Mod: CPTII,,, | Performed by: NURSE PRACTITIONER

## 2022-11-07 PROCEDURE — 3078F DIAST BP <80 MM HG: CPT | Mod: CPTII,,, | Performed by: NURSE PRACTITIONER

## 2022-11-07 PROCEDURE — 99214 PR OFFICE/OUTPT VISIT, EST, LEVL IV, 30-39 MIN: ICD-10-PCS | Mod: S$PBB,,, | Performed by: NURSE PRACTITIONER

## 2022-11-07 PROCEDURE — 1160F PR REVIEW ALL MEDS BY PRESCRIBER/CLIN PHARMACIST DOCUMENTED: ICD-10-PCS | Mod: CPTII,,, | Performed by: NURSE PRACTITIONER

## 2022-11-07 PROCEDURE — 1159F PR MEDICATION LIST DOCUMENTED IN MEDICAL RECORD: ICD-10-PCS | Mod: CPTII,,, | Performed by: NURSE PRACTITIONER

## 2022-11-07 PROCEDURE — 3008F BODY MASS INDEX DOCD: CPT | Mod: CPTII,,, | Performed by: NURSE PRACTITIONER

## 2022-11-07 PROCEDURE — 99999 PR PBB SHADOW E&M-EST. PATIENT-LVL IV: CPT | Mod: PBBFAC,,, | Performed by: NURSE PRACTITIONER

## 2022-11-07 PROCEDURE — 3075F PR MOST RECENT SYSTOLIC BLOOD PRESS GE 130-139MM HG: ICD-10-PCS | Mod: CPTII,,, | Performed by: NURSE PRACTITIONER

## 2022-11-07 PROCEDURE — 3044F PR MOST RECENT HEMOGLOBIN A1C LEVEL <7.0%: ICD-10-PCS | Mod: CPTII,,, | Performed by: NURSE PRACTITIONER

## 2022-11-07 PROCEDURE — 3008F PR BODY MASS INDEX (BMI) DOCUMENTED: ICD-10-PCS | Mod: CPTII,,, | Performed by: NURSE PRACTITIONER

## 2022-11-07 PROCEDURE — 99999 PR PBB SHADOW E&M-EST. PATIENT-LVL IV: ICD-10-PCS | Mod: PBBFAC,,, | Performed by: NURSE PRACTITIONER

## 2022-11-07 RX ORDER — SERTRALINE HYDROCHLORIDE 100 MG/1
150 TABLET, FILM COATED ORAL DAILY
Qty: 135 TABLET | Refills: 3 | Status: SHIPPED | OUTPATIENT
Start: 2022-11-07 | End: 2023-01-19 | Stop reason: ALTCHOICE

## 2022-11-07 RX ORDER — CARIPRAZINE 3 MG/1
3 CAPSULE, GELATIN COATED ORAL DAILY
Qty: 30 CAPSULE | Refills: 5 | Status: SHIPPED | OUTPATIENT
Start: 2022-11-07 | End: 2023-01-19

## 2022-11-07 RX ORDER — CLONAZEPAM 0.5 MG/1
0.5 TABLET ORAL 2 TIMES DAILY PRN
Qty: 30 TABLET | Refills: 1 | Status: SHIPPED | OUTPATIENT
Start: 2022-11-07 | End: 2023-05-11 | Stop reason: SDUPTHER

## 2022-11-07 NOTE — PATIENT INSTRUCTIONS
Take 0.5 tablet of sertraline for first two weeks, then increase to one whole pill (100 mg) for one month. If ok on whole pill, can stay on this dose. If you feel like you need to increase back up to 150 mg,  then titrate up after one month of being on 100 mg dose.

## 2022-11-07 NOTE — PROGRESS NOTES
Subjective:       Patient ID: Maegan Lafleur is a 41 y.o. female.    Chief Complaint: Medication Problem    Mrs Lafleur presents to visit for bipolar/depression. Overall felt better when he first started vraylar, but started with increased emotions and anxiousness after stopping zoloft abruptly. Was doing much better, and even waking up to exercise in morning initially. Feels that she is emotionally all over the place at times. Does not have SI, but does feel like she wants to give up sometimes. Restarted 150 mg after being off for several months, but felt that it made her more anxious.       Patient Active Problem List   Diagnosis    Chronic anxiety    Class 1 obesity due to excess calories with serious comorbidity and body mass index (BMI) of 33.0 to 33.9 in adult    Severe episode of recurrent major depressive disorder, without psychotic features    Bipolar disorder with severe depression    Fatigue    Prediabetes       Family History   Problem Relation Age of Onset    No Known Problems Mother     Diabetes Father      History reviewed. No pertinent surgical history.      Current Outpatient Medications:     ibuprofen (ADVIL,MOTRIN) 800 MG tablet, Take 1 tablet (800 mg total) by mouth 3 (three) times daily., Disp: 30 tablet, Rfl: 0    cariprazine (VRAYLAR) 3 mg Cap, Take 1 capsule (3 mg total) by mouth Daily., Disp: 30 capsule, Rfl: 5    clonazePAM (KLONOPIN) 0.5 MG tablet, Take 1 tablet (0.5 mg total) by mouth 2 (two) times daily as needed (severe anxiety)., Disp: 30 tablet, Rfl: 1    sertraline (ZOLOFT) 100 MG tablet, Take 1.5 tablets (150 mg total) by mouth Daily., Disp: 135 tablet, Rfl: 3    Review of Systems   Constitutional:  Positive for activity change and fatigue. Negative for appetite change, chills and fever.   Respiratory:  Negative for cough and shortness of breath.    Cardiovascular:  Negative for chest pain and palpitations.   Gastrointestinal:  Negative for abdominal pain, nausea and  "vomiting.   Neurological:  Negative for dizziness, light-headedness, numbness and headaches.   Psychiatric/Behavioral:  Positive for dysphoric mood and sleep disturbance. Negative for self-injury and suicidal ideas. The patient is nervous/anxious.      Objective:   /78 (BP Location: Left arm, Patient Position: Sitting)   Pulse (!) 114   Temp 98.6 °F (37 °C)   Ht 5' 6" (1.676 m)   Wt 93 kg (205 lb 0.4 oz)   LMP 10/17/2022   SpO2 96%   BMI 33.09 kg/m²      Physical Exam  Vitals reviewed.   Constitutional:       General: She is not in acute distress.     Appearance: Normal appearance. She is well-developed. She is not diaphoretic.   HENT:      Head: Normocephalic.   Cardiovascular:      Rate and Rhythm: Regular rhythm. Tachycardia present.      Pulses: Normal pulses.      Heart sounds: Normal heart sounds. No murmur heard.    No friction rub. No gallop.   Pulmonary:      Effort: Pulmonary effort is normal. No respiratory distress.      Breath sounds: Normal breath sounds. No rales.   Musculoskeletal:         General: Normal range of motion.      Cervical back: Normal range of motion.   Skin:     General: Skin is warm and dry.      Coloration: Skin is not pale.      Findings: No erythema.   Neurological:      Mental Status: She is alert and oriented to person, place, and time.   Psychiatric:         Mood and Affect: Mood is anxious and depressed. Affect is tearful.         Speech: Speech normal.         Behavior: Behavior normal. Behavior is cooperative.         Thought Content: Thought content normal.       Assessment & Plan     Problem List Items Addressed This Visit          Psychiatric    Chronic anxiety - Primary    Current Assessment & Plan     Ativan PRN added back on for panic episodes. Previously prescribed by psych.          Relevant Medications    clonazePAM (KLONOPIN) 0.5 MG tablet    Severe episode of recurrent major depressive disorder, without psychotic features    Current Assessment & Plan " "    titrating back up on zoloft. Discussed importance of not stopping medication abruptly. Discussed potential side effects. Follow up in one month          Relevant Medications    sertraline (ZOLOFT) 100 MG tablet    Bipolar disorder with severe depression    Current Assessment & Plan     Increasing vraylar. Discussed the importance of not stopping any medications abruptly. Titrate back up on zoloft.          Relevant Medications    cariprazine (VRAYLAR) 3 mg Cap       Endocrine    Class 1 obesity due to excess calories with serious comorbidity and body mass index (BMI) of 33.0 to 33.9 in adult    Current Assessment & Plan     Counseled on importance of diet and exercise in order to improve overall quality of life, and reduce risk of future comorbidities.  Was having motivation for exercising prior to stopping zoloft.           Take 0.5 tablet of sertraline for first two weeks, then increase to one whole pill (100 mg) for one month. If ok on whole pill, can stay on this dose. If you feel like you need to increase back up to 150 mg,  then titrate up after one month of being on 100 mg dose.     Follow up in about 1 month (around 12/7/2022) for depression. .          Portions of this note may have been created with voice recognition software. Occasional "wrong-word" or "sound-a-like" substitutions may have occurred due to the inherent limitations of voice recognition software. Please, read the note carefully and recognize, using context, where substitutions have occurred.         "

## 2022-11-08 NOTE — ASSESSMENT & PLAN NOTE
Counseled on importance of diet and exercise in order to improve overall quality of life, and reduce risk of future comorbidities.  Was having motivation for exercising prior to stopping zoloft.

## 2022-11-08 NOTE — ASSESSMENT & PLAN NOTE
Increasing vraylar. Discussed the importance of not stopping any medications abruptly. Titrate back up on zoloft.

## 2022-11-08 NOTE — ASSESSMENT & PLAN NOTE
titrating back up on zoloft. Discussed importance of not stopping medication abruptly. Discussed potential side effects. Follow up in one month

## 2022-12-14 DIAGNOSIS — Z12.31 OTHER SCREENING MAMMOGRAM: ICD-10-CM

## 2023-01-19 ENCOUNTER — OFFICE VISIT (OUTPATIENT)
Dept: INTERNAL MEDICINE | Facility: CLINIC | Age: 43
End: 2023-01-19
Payer: MEDICAID

## 2023-01-19 VITALS
WEIGHT: 211.19 LBS | SYSTOLIC BLOOD PRESSURE: 132 MMHG | DIASTOLIC BLOOD PRESSURE: 82 MMHG | OXYGEN SATURATION: 97 % | TEMPERATURE: 98 F | HEIGHT: 66 IN | BODY MASS INDEX: 33.94 KG/M2 | HEART RATE: 105 BPM

## 2023-01-19 DIAGNOSIS — Z12.31 BREAST CANCER SCREENING BY MAMMOGRAM: ICD-10-CM

## 2023-01-19 DIAGNOSIS — F41.9 CHRONIC ANXIETY: ICD-10-CM

## 2023-01-19 DIAGNOSIS — F31.4 BIPOLAR DISORDER WITH SEVERE DEPRESSION: Primary | ICD-10-CM

## 2023-01-19 DIAGNOSIS — F33.2 SEVERE EPISODE OF RECURRENT MAJOR DEPRESSIVE DISORDER, WITHOUT PSYCHOTIC FEATURES: ICD-10-CM

## 2023-01-19 PROCEDURE — 3008F PR BODY MASS INDEX (BMI) DOCUMENTED: ICD-10-PCS | Mod: CPTII,,, | Performed by: NURSE PRACTITIONER

## 2023-01-19 PROCEDURE — 3008F BODY MASS INDEX DOCD: CPT | Mod: CPTII,,, | Performed by: NURSE PRACTITIONER

## 2023-01-19 PROCEDURE — 1160F RVW MEDS BY RX/DR IN RCRD: CPT | Mod: CPTII,,, | Performed by: NURSE PRACTITIONER

## 2023-01-19 PROCEDURE — 1159F MED LIST DOCD IN RCRD: CPT | Mod: CPTII,,, | Performed by: NURSE PRACTITIONER

## 2023-01-19 PROCEDURE — 3079F DIAST BP 80-89 MM HG: CPT | Mod: CPTII,,, | Performed by: NURSE PRACTITIONER

## 2023-01-19 PROCEDURE — 3075F SYST BP GE 130 - 139MM HG: CPT | Mod: CPTII,,, | Performed by: NURSE PRACTITIONER

## 2023-01-19 PROCEDURE — 99214 PR OFFICE/OUTPT VISIT, EST, LEVL IV, 30-39 MIN: ICD-10-PCS | Mod: S$PBB,,, | Performed by: NURSE PRACTITIONER

## 2023-01-19 PROCEDURE — 99213 OFFICE O/P EST LOW 20 MIN: CPT | Mod: PBBFAC,PO | Performed by: NURSE PRACTITIONER

## 2023-01-19 PROCEDURE — 99214 OFFICE O/P EST MOD 30 MIN: CPT | Mod: S$PBB,,, | Performed by: NURSE PRACTITIONER

## 2023-01-19 PROCEDURE — 3075F PR MOST RECENT SYSTOLIC BLOOD PRESS GE 130-139MM HG: ICD-10-PCS | Mod: CPTII,,, | Performed by: NURSE PRACTITIONER

## 2023-01-19 PROCEDURE — 99999 PR PBB SHADOW E&M-EST. PATIENT-LVL III: ICD-10-PCS | Mod: PBBFAC,,, | Performed by: NURSE PRACTITIONER

## 2023-01-19 PROCEDURE — 3079F PR MOST RECENT DIASTOLIC BLOOD PRESSURE 80-89 MM HG: ICD-10-PCS | Mod: CPTII,,, | Performed by: NURSE PRACTITIONER

## 2023-01-19 PROCEDURE — 99999 PR PBB SHADOW E&M-EST. PATIENT-LVL III: CPT | Mod: PBBFAC,,, | Performed by: NURSE PRACTITIONER

## 2023-01-19 PROCEDURE — 1159F PR MEDICATION LIST DOCUMENTED IN MEDICAL RECORD: ICD-10-PCS | Mod: CPTII,,, | Performed by: NURSE PRACTITIONER

## 2023-01-19 PROCEDURE — 1160F PR REVIEW ALL MEDS BY PRESCRIBER/CLIN PHARMACIST DOCUMENTED: ICD-10-PCS | Mod: CPTII,,, | Performed by: NURSE PRACTITIONER

## 2023-01-19 RX ORDER — CARIPRAZINE 1.5 MG/1
1.5 CAPSULE, GELATIN COATED ORAL DAILY
Qty: 30 CAPSULE | Refills: 0 | Status: SHIPPED | OUTPATIENT
Start: 2023-01-19 | End: 2023-02-15

## 2023-01-19 RX ORDER — PAROXETINE 30 MG/1
30 TABLET, FILM COATED ORAL EVERY MORNING
Qty: 30 TABLET | Refills: 0 | Status: SHIPPED | OUTPATIENT
Start: 2023-01-19 | End: 2023-01-19

## 2023-01-19 RX ORDER — PAROXETINE 30 MG/1
30 TABLET, FILM COATED ORAL EVERY MORNING
Qty: 30 TABLET | Refills: 0 | Status: SHIPPED | OUTPATIENT
Start: 2023-01-19 | End: 2023-02-15

## 2023-01-19 RX ORDER — CARIPRAZINE 1.5 MG/1
1.5 CAPSULE, GELATIN COATED ORAL DAILY
Qty: 30 CAPSULE | Refills: 0 | Status: SHIPPED | OUTPATIENT
Start: 2023-01-19 | End: 2023-01-19

## 2023-01-19 NOTE — PROGRESS NOTES
Subjective:       Patient ID: Maegan Lafleur is a 42 y.o. female.    Chief Complaint: Depression    Mrs. Lafleur presents to visit for continued depressive symptoms. Escorted by spouse. He has been concerned for her more recently as well. Reports that she even told him today that she thought about running into pond in neighborhood. Has been having SI for past month. She feels that it has been worsening since increase in vraylar to 3 mg after 11/7/2022 visit. Has been having severe anxiousness and crying episodes. Continues klonopin prn during severe episodes.     Depression  Visit Type: follow-up  Patient presents with the following symptoms: anhedonia, decreased concentration, depressed mood, excessive worry, fatigue, feelings of hopelessness, feelings of worthlessness, insomnia, irritability, malaise, nervousness/anxiety, panic, restlessness, suicidal ideas and thoughts of death.  Patient is not experiencing: dizziness, dry mouth, nausea, palpitations, shortness of breath and suicidal planning.  Frequency of symptoms: constantly   Severity: severe   Sleep quality: poor  Compliance with medications:  %    Patient Active Problem List   Diagnosis    Chronic anxiety    Class 1 obesity due to excess calories with serious comorbidity and body mass index (BMI) of 33.0 to 33.9 in adult    Severe episode of recurrent major depressive disorder, without psychotic features    Bipolar disorder with severe depression    Fatigue    Prediabetes       Family History   Problem Relation Age of Onset    No Known Problems Mother     Diabetes Father      History reviewed. No pertinent surgical history.      Current Outpatient Medications:     clonazePAM (KLONOPIN) 0.5 MG tablet, Take 1 tablet (0.5 mg total) by mouth 2 (two) times daily as needed (severe anxiety)., Disp: 30 tablet, Rfl: 1    cariprazine (VRAYLAR) 1.5 mg Cap, Take 1 capsule (1.5 mg total) by mouth once daily., Disp: 30 capsule, Rfl: 0    ibuprofen  "(ADVIL,MOTRIN) 800 MG tablet, Take 1 tablet (800 mg total) by mouth 3 (three) times daily. (Patient not taking: Reported on 1/19/2023), Disp: 30 tablet, Rfl: 0    paroxetine (PAXIL) 30 MG tablet, Take 1 tablet (30 mg total) by mouth every morning., Disp: 30 tablet, Rfl: 0    Review of Systems   Constitutional:  Positive for activity change, appetite change, fatigue and irritability. Negative for chills and fever.   Respiratory:  Negative for shortness of breath.    Cardiovascular:  Negative for chest pain and palpitations.   Gastrointestinal:  Negative for abdominal pain, diarrhea, nausea and vomiting.   Neurological:  Negative for dizziness and headaches.   Psychiatric/Behavioral:  Positive for decreased concentration, depression, dysphoric mood and suicidal ideas. Negative for self-injury and sleep disturbance. The patient is nervous/anxious and has insomnia.      Objective:   /82 (BP Location: Left arm, Patient Position: Sitting, BP Method: Medium (Manual))   Pulse 105   Temp 98.1 °F (36.7 °C)   Ht 5' 6" (1.676 m)   Wt 95.8 kg (211 lb 3.2 oz)   LMP 01/15/2023   SpO2 97%   BMI 34.09 kg/m²      Physical Exam  Constitutional:       General: She is not in acute distress.     Appearance: Normal appearance. She is obese. She is not ill-appearing.   Cardiovascular:      Rate and Rhythm: Normal rate.   Pulmonary:      Effort: Pulmonary effort is normal. No respiratory distress.   Skin:     General: Skin is warm and dry.      Coloration: Skin is not pale.      Findings: No erythema.   Neurological:      Mental Status: She is alert and oriented to person, place, and time.      GCS: GCS eye subscore is 4. GCS verbal subscore is 5. GCS motor subscore is 6.      Comments: Bilateral nystagmus   Psychiatric:         Mood and Affect: Mood is anxious and depressed. Affect is tearful.         Behavior: Behavior is cooperative.       Assessment & Plan     Problem List Items Addressed This Visit          Psychiatric    " "Chronic anxiety    Current Assessment & Plan     Continue klonopin prn. Changing to paxil from zoloft.          Severe episode of recurrent major depressive disorder, without psychotic features    Current Assessment & Plan     Changing SSRI to paxil since she has had no improvement with zoloft increase to 150. Following up close in 2 weeks. Discussed going to ED if symptoms worsen of SI develop.          Relevant Medications    paroxetine (PAXIL) 30 MG tablet    Bipolar disorder with severe depression - Primary    Current Assessment & Plan     Recommended that patient see Psychiatry soon as possible since she has had a significant history of trial of different medications without any improvement.  Information given for clinic in for [facility]. Coverage of medicaid complicates ability for pt to get in for appt.  Decreasing Vraylar for now since she feels that the increase dose caused her to have significant anxiety and depression. Changing SSRI.          Relevant Medications    cariprazine (VRAYLAR) 1.5 mg Cap    paroxetine (PAXIL) 30 MG tablet     Other Visit Diagnoses       Breast cancer screening by mammogram        Relevant Orders    Mammo Digital Screening [facility] w/ [facility]            Follow up in about 2 weeks (around 2/2/2023) for depression. .          Portions of this note may have been created with voice recognition software. Occasional "wrong-word" or "sound-a-like" substitutions may have occurred due to the inherent limitations of voice recognition software. Please, read the note carefully and recognize, using context, where substitutions have occurred.       "

## 2023-01-20 NOTE — ASSESSMENT & PLAN NOTE
Recommended that patient see Psychiatry soon as possible since she has had a significant history of trial of different medications without any improvement.  Information given for clinic in for [facility]. Coverage of medicaid complicates ability for pt to get in for appt.  Decreasing Vraylar for now since she feels that the increase dose caused her to have significant anxiety and depression. Changing SSRI.

## 2023-01-20 NOTE — ASSESSMENT & PLAN NOTE
Changing SSRI to paxil since she has had no improvement with zoloft increase to 150. Following up close in 2 weeks. Discussed going to ED if symptoms worsen of SI develop.

## 2023-04-19 ENCOUNTER — PATIENT MESSAGE (OUTPATIENT)
Dept: ADMINISTRATIVE | Facility: HOSPITAL | Age: 43
End: 2023-04-19
Payer: MEDICAID

## 2023-05-11 ENCOUNTER — TELEPHONE (OUTPATIENT)
Dept: PULMONOLOGY | Facility: CLINIC | Age: 43
End: 2023-05-11
Payer: MEDICAID

## 2023-05-11 ENCOUNTER — OFFICE VISIT (OUTPATIENT)
Dept: INTERNAL MEDICINE | Facility: CLINIC | Age: 43
End: 2023-05-11
Payer: MEDICAID

## 2023-05-11 VITALS
WEIGHT: 221.56 LBS | DIASTOLIC BLOOD PRESSURE: 76 MMHG | OXYGEN SATURATION: 95 % | TEMPERATURE: 98 F | BODY MASS INDEX: 35.61 KG/M2 | HEART RATE: 113 BPM | HEIGHT: 66 IN | SYSTOLIC BLOOD PRESSURE: 122 MMHG

## 2023-05-11 DIAGNOSIS — R73.03 PREDIABETES: ICD-10-CM

## 2023-05-11 DIAGNOSIS — F31.4 BIPOLAR DISORDER WITH SEVERE DEPRESSION: ICD-10-CM

## 2023-05-11 DIAGNOSIS — R06.83 SNORING: Primary | ICD-10-CM

## 2023-05-11 DIAGNOSIS — E66.01 CLASS 2 SEVERE OBESITY DUE TO EXCESS CALORIES WITH SERIOUS COMORBIDITY AND BODY MASS INDEX (BMI) OF 35.0 TO 35.9 IN ADULT: ICD-10-CM

## 2023-05-11 DIAGNOSIS — F33.2 SEVERE EPISODE OF RECURRENT MAJOR DEPRESSIVE DISORDER, WITHOUT PSYCHOTIC FEATURES: ICD-10-CM

## 2023-05-11 DIAGNOSIS — F41.9 CHRONIC ANXIETY: ICD-10-CM

## 2023-05-11 PROBLEM — E66.812 CLASS 2 SEVERE OBESITY DUE TO EXCESS CALORIES WITH SERIOUS COMORBIDITY AND BODY MASS INDEX (BMI) OF 35.0 TO 35.9 IN ADULT: Status: ACTIVE | Noted: 2019-03-19

## 2023-05-11 PROCEDURE — 3008F PR BODY MASS INDEX (BMI) DOCUMENTED: ICD-10-PCS | Mod: CPTII,,, | Performed by: NURSE PRACTITIONER

## 2023-05-11 PROCEDURE — 3074F PR MOST RECENT SYSTOLIC BLOOD PRESSURE < 130 MM HG: ICD-10-PCS | Mod: CPTII,,, | Performed by: NURSE PRACTITIONER

## 2023-05-11 PROCEDURE — 3078F DIAST BP <80 MM HG: CPT | Mod: CPTII,,, | Performed by: NURSE PRACTITIONER

## 2023-05-11 PROCEDURE — 3008F BODY MASS INDEX DOCD: CPT | Mod: CPTII,,, | Performed by: NURSE PRACTITIONER

## 2023-05-11 PROCEDURE — 99999 PR PBB SHADOW E&M-EST. PATIENT-LVL III: ICD-10-PCS | Mod: PBBFAC,,, | Performed by: NURSE PRACTITIONER

## 2023-05-11 PROCEDURE — 3078F PR MOST RECENT DIASTOLIC BLOOD PRESSURE < 80 MM HG: ICD-10-PCS | Mod: CPTII,,, | Performed by: NURSE PRACTITIONER

## 2023-05-11 PROCEDURE — 3074F SYST BP LT 130 MM HG: CPT | Mod: CPTII,,, | Performed by: NURSE PRACTITIONER

## 2023-05-11 PROCEDURE — 1159F MED LIST DOCD IN RCRD: CPT | Mod: CPTII,,, | Performed by: NURSE PRACTITIONER

## 2023-05-11 PROCEDURE — 1160F RVW MEDS BY RX/DR IN RCRD: CPT | Mod: CPTII,,, | Performed by: NURSE PRACTITIONER

## 2023-05-11 PROCEDURE — 1159F PR MEDICATION LIST DOCUMENTED IN MEDICAL RECORD: ICD-10-PCS | Mod: CPTII,,, | Performed by: NURSE PRACTITIONER

## 2023-05-11 PROCEDURE — 99999 PR PBB SHADOW E&M-EST. PATIENT-LVL III: CPT | Mod: PBBFAC,,, | Performed by: NURSE PRACTITIONER

## 2023-05-11 PROCEDURE — 99213 OFFICE O/P EST LOW 20 MIN: CPT | Mod: PBBFAC,PO | Performed by: NURSE PRACTITIONER

## 2023-05-11 PROCEDURE — 1160F PR REVIEW ALL MEDS BY PRESCRIBER/CLIN PHARMACIST DOCUMENTED: ICD-10-PCS | Mod: CPTII,,, | Performed by: NURSE PRACTITIONER

## 2023-05-11 PROCEDURE — 99214 OFFICE O/P EST MOD 30 MIN: CPT | Mod: S$PBB,,, | Performed by: NURSE PRACTITIONER

## 2023-05-11 PROCEDURE — 99214 PR OFFICE/OUTPT VISIT, EST, LEVL IV, 30-39 MIN: ICD-10-PCS | Mod: S$PBB,,, | Performed by: NURSE PRACTITIONER

## 2023-05-11 RX ORDER — CLONAZEPAM 0.5 MG/1
0.5 TABLET ORAL 2 TIMES DAILY PRN
Qty: 30 TABLET | Refills: 1 | Status: SHIPPED | OUTPATIENT
Start: 2023-05-11 | End: 2023-05-11 | Stop reason: SDUPTHER

## 2023-05-11 RX ORDER — PAROXETINE HYDROCHLORIDE 40 MG/1
40 TABLET, FILM COATED ORAL EVERY MORNING
Qty: 30 TABLET | Refills: 11 | Status: SHIPPED | OUTPATIENT
Start: 2023-05-11 | End: 2023-05-29 | Stop reason: SDUPTHER

## 2023-05-11 RX ORDER — SEMAGLUTIDE 0.68 MG/ML
0.5 INJECTION, SOLUTION SUBCUTANEOUS
Qty: 3 ML | Refills: 0 | Status: SHIPPED | OUTPATIENT
Start: 2023-05-11 | End: 2023-06-13

## 2023-05-11 RX ORDER — CLONAZEPAM 0.5 MG/1
0.5 TABLET ORAL 2 TIMES DAILY PRN
Qty: 30 TABLET | Refills: 1 | Status: SHIPPED | OUTPATIENT
Start: 2023-05-11

## 2023-05-11 RX ORDER — PAROXETINE HYDROCHLORIDE 40 MG/1
40 TABLET, FILM COATED ORAL EVERY MORNING
Qty: 30 TABLET | Refills: 11 | Status: SHIPPED | OUTPATIENT
Start: 2023-05-11 | End: 2023-05-11 | Stop reason: SDUPTHER

## 2023-05-11 RX ORDER — SEMAGLUTIDE 0.68 MG/ML
0.5 INJECTION, SOLUTION SUBCUTANEOUS
Qty: 3 ML | Refills: 0 | Status: SHIPPED | OUTPATIENT
Start: 2023-05-11 | End: 2023-05-11 | Stop reason: SDUPTHER

## 2023-05-11 NOTE — ASSESSMENT & PLAN NOTE
Increasing paxil. Referral to psych and also recommended counseling. Information given to local SW.

## 2023-05-11 NOTE — PROGRESS NOTES
Subjective:       Patient ID: Maegan Lafleur is a 42 y.o. female.    Chief Complaint: Depression (Pt is having difficulty sleeping. )    Mrs. Lafleur presents to visit for complaint of continued depression, and possible sleep apnea. In regards to sleep problems, reports of significant snoring and occasional apneic episodes witnessed by spouse. Has significant day time sleepiness even when she sleeps all night.     At last visit for depression, vraylar was decreased back to 1.5 mg due to feeling that it was worse with dose increase. Also changed SSRI to paxil. Never followed up after last visit. Reports quitting her job a couple of months ago due to depressive symptoms and feeling overwhelmed. 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 Does not have motivation to exercise or do other activities. All she wants to do is sleep all day. Has not been in counseling. Did get pill organizer to try to ensure that she takes medications every day.     Depression  Visit Type: follow-up  Patient presents with the following symptoms: anhedonia, decreased concentration, depressed mood, excessive worry, fatigue, feelings of hopelessness, feelings of worthlessness, hypersomnia, irritability, malaise and nervousness/anxiety.  Patient is not experiencing: palpitations and shortness of breath.  Frequency of symptoms: constantly   Severity: severe   Compliance with medications:  51-75%      Patient Active Problem List   Diagnosis    Chronic anxiety    Class 2 severe obesity due to excess calories with serious comorbidity and body mass index (BMI) of 35.0 to 35.9 in adult    Severe episode of recurrent major depressive disorder, without psychotic features    Bipolar disorder with severe depression    Fatigue    Prediabetes       Family History   Problem Relation Age of Onset    No Known Problems Mother     Diabetes Father      History reviewed. No pertinent surgical history.      Current Outpatient Medications:     VRAYLAR  "1.5 mg Cap, TAKE 1 CAPSULE(1.5 MG) BY MOUTH EVERY DAY, Disp: 30 capsule, Rfl: 0    clonazePAM (KLONOPIN) 0.5 MG tablet, Take 1 tablet (0.5 mg total) by mouth 2 (two) times daily as needed (severe anxiety)., Disp: 30 tablet, Rfl: 1    paroxetine (PAXIL) 40 MG tablet, Take 1 tablet (40 mg total) by mouth every morning., Disp: 30 tablet, Rfl: 11    semaglutide (OZEMPIC) 0.25 mg or 0.5 mg (2 mg/3 mL) pen injector, Inject 0.5 mg into the skin every 7 days., Disp: 3 mL, Rfl: 0    Review of Systems   Constitutional:  Positive for fatigue and irritability. Negative for appetite change, chills and fever.   Respiratory:  Negative for shortness of breath.    Cardiovascular:  Negative for chest pain and palpitations.   Gastrointestinal:  Negative for abdominal pain, constipation, diarrhea, nausea and vomiting.   Neurological:  Negative for dizziness, light-headedness and headaches.   Psychiatric/Behavioral:  Positive for decreased concentration, depression, dysphoric mood and sleep disturbance. The patient is nervous/anxious.      Objective:   /76 (BP Location: Left arm, Patient Position: Sitting, BP Method: Large (Manual))   Pulse (!) 113   Temp 98.1 °F (36.7 °C)   Ht 5' 6" (1.676 m)   Wt 100.5 kg (221 lb 9 oz)   SpO2 95%   BMI 35.76 kg/m²      Physical Exam  Vitals reviewed.   Constitutional:       General: She is not in acute distress.     Appearance: Normal appearance. She is well-developed. She is obese. She is not ill-appearing or diaphoretic.   HENT:      Head: Normocephalic.   Cardiovascular:      Rate and Rhythm: Normal rate and regular rhythm.      Pulses: Normal pulses.      Heart sounds: Normal heart sounds. No murmur heard.    No friction rub. No gallop.   Pulmonary:      Effort: Pulmonary effort is normal. No respiratory distress.      Breath sounds: Normal breath sounds. No rales.   Musculoskeletal:      Cervical back: Normal range of motion and neck supple.   Skin:     General: Skin is warm and dry. " "     Coloration: Skin is not pale.      Findings: No erythema.   Neurological:      Mental Status: She is alert and oriented to person, place, and time.   Psychiatric:         Mood and Affect: Mood is depressed. Affect is flat.         Behavior: Behavior is slowed. Behavior is cooperative.       Assessment & Plan     Problem List Items Addressed This Visit          Psychiatric    Chronic anxiety    Current Assessment & Plan     Increasing dose of paxil. Klonopin prn.            Relevant Medications    clonazePAM (KLONOPIN) 0.5 MG tablet    Severe episode of recurrent major depressive disorder, without psychotic features    Current Assessment & Plan     Increasing paxil. Referral to psych and also recommended counseling. Information given to local .            Relevant Medications    paroxetine (PAXIL) 40 MG tablet    Other Relevant Orders    Ambulatory referral/consult to Psychiatry    Bipolar disorder with severe depression    Current Assessment & Plan     Referral to psych. Continue on vraylar for now.            Relevant Orders    Ambulatory referral/consult to Psychiatry       Endocrine    Class 2 severe obesity due to excess calories with serious comorbidity and body mass index (BMI) of 35.0 to 35.9 in adult    Current Assessment & Plan     Counseled on importance of diet and exercise in order to improve overall quality of life, and reduce risk of future comorbidities.             Relevant Medications    semaglutide (OZEMPIC) 0.25 mg or 0.5 mg (2 mg/3 mL) pen injector    Prediabetes    Current Assessment & Plan     Trial of ozempic.            Relevant Medications    semaglutide (OZEMPIC) 0.25 mg or 0.5 mg (2 mg/3 mL) pen injector     Other Visit Diagnoses       Snoring    -  Primary    Relevant Orders    Home Sleep Study            Follow up in about 1 month (around 6/11/2023).          Portions of this note may have been created with voice recognition software. Occasional "wrong-word" or "sound-a-like" " substitutions may have occurred due to the inherent limitations of voice recognition software. Please, read the note carefully and recognize, using context, where substitutions have occurred.

## 2023-05-18 DIAGNOSIS — F31.4 BIPOLAR DISORDER WITH SEVERE DEPRESSION: ICD-10-CM

## 2023-05-18 RX ORDER — CARIPRAZINE 1.5 MG/1
CAPSULE, GELATIN COATED ORAL
Qty: 30 CAPSULE | Refills: 5 | Status: SHIPPED | OUTPATIENT
Start: 2023-05-18 | End: 2023-09-21 | Stop reason: ALTCHOICE

## 2023-05-18 NOTE — TELEPHONE ENCOUNTER
Refill Routing Note   Medication(s) are not appropriate for processing by [facility] for the following reason(s):      Non-participating provider    [facility] action(s):  Route None identified          Appointments  past 12m or future 3m with PCP    Date Provider   Last Visit   5/11/2023 Julianna Loya NP   Next Visit   6/13/2023 Julianna Loya NP   ED visits in past 90 days: 0        Note composed:9:22 AM 05/18/2023

## 2023-05-26 ENCOUNTER — PATIENT MESSAGE (OUTPATIENT)
Dept: INTERNAL MEDICINE | Facility: CLINIC | Age: 43
End: 2023-05-26
Payer: MEDICAID

## 2023-05-29 ENCOUNTER — PATIENT MESSAGE (OUTPATIENT)
Dept: INTERNAL MEDICINE | Facility: CLINIC | Age: 43
End: 2023-05-29
Payer: MEDICAID

## 2023-05-29 DIAGNOSIS — F33.2 SEVERE EPISODE OF RECURRENT MAJOR DEPRESSIVE DISORDER, WITHOUT PSYCHOTIC FEATURES: ICD-10-CM

## 2023-05-29 RX ORDER — PAROXETINE HYDROCHLORIDE 40 MG/1
40 TABLET, FILM COATED ORAL DAILY
Qty: 30 TABLET | Refills: 5 | Status: SHIPPED | OUTPATIENT
Start: 2023-05-29 | End: 2023-12-04 | Stop reason: SDUPTHER

## 2023-05-30 ENCOUNTER — HOSPITAL ENCOUNTER (OUTPATIENT)
Dept: SLEEP MEDICINE | Facility: HOSPITAL | Age: 43
Discharge: HOME | End: 2023-05-30
Attending: NURSE PRACTITIONER
Payer: MEDICAID

## 2023-05-30 DIAGNOSIS — G47.33 OSA (OBSTRUCTIVE SLEEP APNEA): Primary | ICD-10-CM

## 2023-05-30 DIAGNOSIS — R06.83 SNORING: ICD-10-CM

## 2023-05-30 PROCEDURE — 95800 PR SLEEP STUDY, UNATTENDED, RECORD HEART RATE/O2 SAT/RESP ANAL/SLEEP TIME: ICD-10-PCS | Mod: 26,52,, | Performed by: INTERNAL MEDICINE

## 2023-05-30 PROCEDURE — 95800 SLP STDY UNATTENDED: CPT | Mod: 26,52,, | Performed by: INTERNAL MEDICINE

## 2023-05-30 NOTE — Clinical Note
PHYSICIAN INTERPRETATION AND COMMENTS: Findings are consistent with moderate, non-positional obstructive sleep apnea(ALEJANDRO). CPAP indicated, Please refer to sleep disorders clinic CLINICAL HISTORY: 42 year old female presented with: 15 inch neck, BMI of 34.2, an Marshall sleepiness score of 17, history of depression and symptoms of nocturnal snoring, waking up choking and witnessed apneas. Based on the clinical history, the patient has a high pre-test probability of having Moderate ALEJANDRO.

## 2023-05-30 NOTE — PROCEDURES
PHYSICIAN INTERPRETATION AND COMMENTS: Findings are consistent with moderate, non-positional obstructive sleep  apnea(ALEJANDRO). CPAP indicated, Please refer to sleep disorders clinic  CLINICAL HISTORY: 42 year old female presented with: 15 inch neck, BMI of 34.2, an Melcher Dallas sleepiness score of 17, history  of depression and symptoms of nocturnal snoring, waking up choking and witnessed apneas. Based on the clinical  history, the patient has a high pre-test probability of having Moderate ALEJANDRO.  SLEEP STUDY FINDINGS: Patient underwent a 2 night Home Sleep Test and by behavioral criteria, slept for approximately  3.91 hours, with a sleep latency of 12 minutes and a sleep efficiency of 73.6%. Mild sleep disordered breathing (AHI=7) is  noted based on a 4% hypopnea desaturation criteria. The patient slept supine 40% of the night based on valid recording  time of 4.07 hours. When considering more subtle measures of sleep disordered breathing, the overall respiratory  disturbance index is moderate(RDI=27) based on a 1% hypopnea desaturation criteria with confirmation by surrogate  arousal indicators. The apneas/hypopneas are accompanied by minimal oxygen desaturation (percent time below 90%  SpO2: 0%, Min SpO2: 89.5%). The average desaturation across all sleep disordered breathing events is 1.9%. Snoring occurs  for 3.1% (30 dB) of the study, 1.3% is very loud. The mean pulse rate is 77.7 BPM, with very frequent pulse rate variability (66  events with >= 6 BPM increase/decrease per hour).  TREATMENT CONSIDERATIONS: Consider nasal continuous positive airway pressure (CPAP/AutoPAP) as the initial  treatment choice based on the RDI severity, daytime somnolence and co-morbidities. A mandibular advancement splint  (MAS) or referral to an ENT surgeon for modification to the airway should be considered to reduce daytime somnolence  and the potential contribution of ALEJANDRO on existing diseases if the patient prefers an alternative therapy  "or the CPAP trial is  Unsuccessful    Dear Julianna Loya NP  [id number] [address] [address]/Julianna Loya NP         The sleep study that you ordered is complete.  You have ordered sleep LAB services to perform the sleep study for Maegan Lafleur .      Please find Sleep Study result in  the "Media tab" of Chart Review menu.        You can look  for the report in the  Media by the document type "Sleep Study Documents". Alphabetizing  "Document type" column helps to find the SLEEP STUDY report  Faster.       As the ordering provider, you are responsible for reviewing the results and implementing a treatment plan with your patient.    If you need a Sleep Medicine provider to explain the sleep study findings and arrange treatment for the patient, please refer patient for consultation to our Sleep Clinic via [facility] with Ambulatory Consult Sleep.     To do that please place an order for an  "Ambulatory Consult Sleep" -  order , it will go to our clinic work queue for our staff  to contact the patient for an appointment.      For any questions, please contact our sleep lab  staff at [phone] to talk to clinical staff          Jude Pink MD   "

## 2023-05-31 ENCOUNTER — PATIENT MESSAGE (OUTPATIENT)
Dept: PULMONOLOGY | Facility: CLINIC | Age: 43
End: 2023-05-31
Payer: MEDICAID

## 2023-05-31 DIAGNOSIS — G47.33 OSA (OBSTRUCTIVE SLEEP APNEA): Primary | ICD-10-CM

## 2023-06-01 ENCOUNTER — TELEPHONE (OUTPATIENT)
Dept: INTERNAL MEDICINE | Facility: CLINIC | Age: 43
End: 2023-06-01
Payer: MEDICAID

## 2023-06-01 PROBLEM — G47.33 OBSTRUCTIVE SLEEP APNEA: Status: ACTIVE | Noted: 2023-06-01

## 2023-06-01 NOTE — PROGRESS NOTES
The patient location is: home in [place]   The chief complaint leading to consultation is: sleep apnea    Visit type: audiovisual    Face to Face time with patient:   23 minutes of total time spent on the encounter, which includes face to face time and non-face to face time preparing to see the patient (eg, review of tests), Obtaining and/or reviewing separately obtained history, Documenting clinical information in the electronic or other health record, Independently interpreting results (not separately reported) and communicating results to the patient/family/caregiver, or Care coordination (not separately reported).         Each patient to whom he or she provides medical services by telemedicine is:  (1) informed of the relationship between the physician and patient and the respective role of any other health care provider with respect to management of the patient; and (2) notified that he or she may decline to receive medical services by telemedicine and may withdraw from such care at any time.    Subjective:      Patient ID: Maegan Lafleur is a 42 y.o. female.    Chief Complaint: Sleep Apnea    HPI: Maegan Lafleur engaged in telemedicine visit for ALEJANDRO with review of home sleep study that revealed mild to moderate sleep apnea.    5/26/2023, 5/27/2023 home sleep study 2 nights Mild sleep disordered breathing (AHI=7) is noted based on a 4% hypopnea desaturation criteria.    Sleep Apnea  Patient has been observed snoring with apnea.   Patient reports non restful' sleep.  She reports occasional morning headache.   She reports day time napping.  Day time napping duration 2 or more hours  She denies recent weight gain.  Cardiovascular risk factors: obesity  Bed time is 0900  Wake time is 0630  Sleep onset is within  15 - 30 Minutes.  Sleep maintenance difficulties related to frequent night time awakening and non-restful sleep  Wake after sleep onset occurs two times a night.  Nocturia occurs two times a  night,   Sleep aids : No  Dry mouth : No  Sleep walking: No  Sleep talking : No  Sleep eating:No  Vivid Dreams : No  Cataplexy : No    Hudgins Sleepiness Scale   EPWORTH SLEEPINESS SCALE 6/2/2023   Sitting and reading 3   Watching TV 3   Sitting, inactive in a public place (e.g. a theatre or a meeting) 0   As a passenger in a car for an hour without a break 3   Lying down to rest in the afternoon when circumstances permit 3   Sitting and talking to someone 3   Sitting quietly after a lunch without alcohol 3   In a car, while stopped for a few minutes in traffic 0   Total score 18       Neck circumference is na telemed visit 15.  Mallampati score 3    STOP - BANG Questionnaire:   1. Snoring : Do you snore loudly ?     YES  2. Tired : Do you often feel tired, fatigued, or sleepy during daytime?   YES  3. Observed: Has anyone observed you stop breathing during your sleep?     YES  4. Blood pressure : Do you have or are you being treated for high blood pressure?   NO  5. BMI :BMI more than 35 kg/m2?    YES  6. Age : Age over 50 yr old?   NO  7. Neck circumference: Neck circumference greater than 40 cm?   NO  8. Gender: Gender male?   NO    SCORE: 4    High risk of ALEJANDRO: Yes 5 - 8  Intermediate risk of ALEJANDRO: Yes 3 - 4  Low risk of ALEJANDRO: Yes 0 - 2        Hudgins Sleepiness Scale   EPWORTH SLEEPINESS SCALE 6/2/2023   Sitting and reading 3   Watching TV 3   Sitting, inactive in a public place (e.g. a theatre or a meeting) 0   As a passenger in a car for an hour without a break 3   Lying down to rest in the afternoon when circumstances permit 3   Sitting and talking to someone 3   Sitting quietly after a lunch without alcohol 3   In a car, while stopped for a few minutes in traffic 0   Total score 18       Previous Report Reviewed: lab reports and office notes     Past Medical History: The following portions of the patient's history were reviewed and updated as appropriate:   She  has no past surgical history on file.  Her  family history includes Diabetes in her father; No Known Problems in her mother.  She  reports that she has never smoked. She has never used smokeless tobacco. She reports that she does not currently use alcohol. She reports that she does not currently use drugs.  She has a current medication list which includes the following prescription(s): vraylar, clonazepam, paroxetine, and ozempic.  She is allergic to escitalopram oxalate..    The following portions of the patient's history were reviewed and updated as appropriate: allergies, current medications, past family history, past medical history, past social history, past surgical history and problem list.    Review of Systems   Constitutional:  Negative for fever, chills, weight loss, weight gain, activity change, appetite change, fatigue and night sweats.   HENT:  Negative for postnasal drip, rhinorrhea, sinus pressure, voice change and congestion.    Eyes:  Negative for redness and itching.   Respiratory:  Positive for apnea and snoring. Negative for cough, sputum production, chest tightness, shortness of breath, wheezing, orthopnea, asthma nighttime symptoms, dyspnea on extertion, use of rescue inhaler and somnolence.    Cardiovascular: Negative.  Negative for chest pain, palpitations and leg swelling.   Genitourinary:  Negative for difficulty urinating and hematuria.   Endocrine:  Negative for cold intolerance and heat intolerance.    Musculoskeletal:  Negative for arthralgias, gait problem, joint swelling and myalgias.   Skin: Negative.    Gastrointestinal:  Negative for nausea, vomiting, abdominal pain and acid reflux.   Neurological:  Negative for dizziness, weakness, light-headedness and headaches.   Hematological:  Negative for adenopathy. No excessive bruising.   All other systems reviewed and are negative.   Objective:   Wt 96.2 kg (212 lb)   BMI 34.22 kg/m²   Physical Exam  Vitals and nursing note reviewed.   Constitutional:       General: She is awake.       Appearance: Normal appearance. She is well-developed and well-groomed.   HENT:      Head: Normocephalic.   Eyes:      Conjunctiva/sclera: Conjunctivae normal.   Pulmonary:      Effort: Pulmonary effort is normal.   Neurological:      Mental Status: She is alert.   Psychiatric:         Mood and Affect: Mood normal.         Behavior: Behavior normal. Behavior is cooperative.         Thought Content: Thought content normal.         Judgment: Judgment normal.       Personal Diagnostic Review  CPAP download    Assessment:     1. Obstructive sleep apnea    2. ALEJANDRO (obstructive sleep apnea)    3. Class 2 severe obesity due to excess calories with serious comorbidity and body mass index (BMI) of 35.0 to 35.9 in adult    4. Chronic anxiety    5. Bipolar disorder with severe depression    6. Prediabetes        Orders Placed This Encounter   Procedures    CPAP FOR HOME USE     5/26/2022, 5/27/2022 home sleep study 2 nights Mild sleep disordered breathing (AHI=7) is noted based on a 4% hypopnea desaturation criteria.     Order Specific Question:   Length of need (1-99 months):     Answer:   99     Order Specific Question:   Type ():     Answer:   Auto CPAP     Order Specific Question:   Auto CPAP pressure setting range (cmH20):     Answer:   5-20     Order Specific Question:   Fulfillment Priority:     Answer:   Level 4:  all others     Order Specific Question:   Humidification ():     Answer:   Heated     Order Specific Question:   Choose ONE mask type and its corresponding cushions and/or pillows:     Answer:    Nasal Mask, 1 per 90 days:  Nasal Cushions, (6 per 90 days):  Nasal Pillows, (6 per 90 days)     Order Specific Question:   Choose EITHER Heated or Non-Heated Tubjing     Answer:    Non-Heated Tubing, 1 per 90 days     Order Specific Question:   Number of Days Needed:     Answer:   99     Order Specific Question:   All other supplies as needed as listed below:     Answer:     Headgear, 1 per 180 days     Order Specific Question:   All other supplies as needed as listed below:     Answer:    Chin Strap, 1 per 180 days     Order Specific Question:   All other supplies as needed as listed below:     Answer:    Disposable Filter, 6 per 90 days     Order Specific Question:   All other supplies as needed as listed below:     Answer:    Non-Disposable Filter, 1 per 180 days     Order Specific Question:   All other supplies as needed as listed below:     Answer:    Humidifier Chamber, 1 per 180 days     Plan:     Problem List Items Addressed This Visit       Prediabetes     Continued management with primary care provider   On ozempic  With benefit  Hemoglobin A1C   Date Value Ref Range Status   08/09/2022 5.7 (H) 4.0 - 5.6 % Final     Comment:     ADA Screening Guidelines:  5.7-6.4%  Consistent with prediabetes  >or=6.5%  Consistent with diabetes    High levels of fetal hemoglobin interfere with the HbA1C  assay. Heterozygous hemoglobin variants (HbS, HgC, etc)do  not significantly interfere with this assay.   However, presence of multiple variants may affect accuracy.                Obstructive sleep apnea - Primary     5/26/2022, 5/27/2022 home sleep study 2 nights Mild sleep disordered breathing (AHI=7) is noted based on a 4% hypopnea desaturation criteria.  6/2/2023 order Auto CPAP 5 -20 cm   Nasal mask   HME: [facility]   Follow up 31-90 days from obtaining PAP therapy for IDL.                Relevant Orders    CPAP FOR HOME USE    Class 2 severe obesity due to excess calories with serious comorbidity and body mass index (BMI) of 35.0 to 35.9 in adult     Encouraged calorie reduction and 30 minutes of exercise daily. Discussed impact of obesity on general health.  Wt Readings from Last 9 Encounters:   06/02/23 96.2 kg (212 lb)   05/11/23 100.5 kg (221 lb 9 oz)   01/19/23 95.8 kg (211 lb 3.2 oz)   11/07/22 93 kg (205 lb 0.4 oz)   08/29/22 93.3 kg (205 lb 11 oz)   08/09/22  93.6 kg (206 lb 5.6 oz)   07/05/22 92.4 kg (203 lb 9.5 oz)   12/17/21 90.8 kg (200 lb 2.8 oz)   10/27/21 92.6 kg (204 lb 2.3 oz)   Body mass index is 34.22 kg/m².           Chronic anxiety     Stable and controlled. Continue current treatment plan as previously prescribed with your PCP.              Bipolar disorder with severe depression     Stable and controlled. Continue current treatment plan as previously prescribed with your PCP.               Other Visit Diagnoses       ALEJANDRO (obstructive sleep apnea)               (DME) - [facility]  Reviewed therapeutic goals for positive airway pressure therapy Auto CPAP  Ideal is usage 100% of nights for 6 - 8 hours per night. Minimum usage is 70% of night for at least 4 hours per night used.     Follow up in about 10 weeks (around 8/11/2023) for CPAP compliance download after initial set up at the [facility]. .

## 2023-06-01 NOTE — TELEPHONE ENCOUNTER
Informed patient to keep appointment that is schedule for tomorrow with pulmonary and they will get everything done with cpap machine

## 2023-06-01 NOTE — TELEPHONE ENCOUNTER
----- Message from Carina Castanon sent at 6/1/2023  4:11 PM CDT -----  Contact: Maegan  Patient is calling to speak with someone regarding CPAP. Patient request a referral for a CPAP machine is sent to the facility below. Please give patient a call back at [phone] to assist.    [address] [address]    Thank you,  GH

## 2023-06-02 ENCOUNTER — OFFICE VISIT (OUTPATIENT)
Dept: PULMONOLOGY | Facility: CLINIC | Age: 43
End: 2023-06-02
Payer: MEDICAID

## 2023-06-02 VITALS — BODY MASS INDEX: 34.22 KG/M2 | WEIGHT: 212 LBS

## 2023-06-02 DIAGNOSIS — E66.01 CLASS 2 SEVERE OBESITY DUE TO EXCESS CALORIES WITH SERIOUS COMORBIDITY AND BODY MASS INDEX (BMI) OF 35.0 TO 35.9 IN ADULT: ICD-10-CM

## 2023-06-02 DIAGNOSIS — G47.33 OBSTRUCTIVE SLEEP APNEA: Primary | ICD-10-CM

## 2023-06-02 DIAGNOSIS — F31.4 BIPOLAR DISORDER WITH SEVERE DEPRESSION: ICD-10-CM

## 2023-06-02 DIAGNOSIS — F41.9 CHRONIC ANXIETY: ICD-10-CM

## 2023-06-02 DIAGNOSIS — R73.03 PREDIABETES: ICD-10-CM

## 2023-06-02 DIAGNOSIS — G47.33 OSA (OBSTRUCTIVE SLEEP APNEA): ICD-10-CM

## 2023-06-02 PROCEDURE — 3008F BODY MASS INDEX DOCD: CPT | Mod: CPTII,95,, | Performed by: NURSE PRACTITIONER

## 2023-06-02 PROCEDURE — 1159F PR MEDICATION LIST DOCUMENTED IN MEDICAL RECORD: ICD-10-PCS | Mod: CPTII,95,, | Performed by: NURSE PRACTITIONER

## 2023-06-02 PROCEDURE — 1159F MED LIST DOCD IN RCRD: CPT | Mod: CPTII,95,, | Performed by: NURSE PRACTITIONER

## 2023-06-02 PROCEDURE — 1160F PR REVIEW ALL MEDS BY PRESCRIBER/CLIN PHARMACIST DOCUMENTED: ICD-10-PCS | Mod: CPTII,95,, | Performed by: NURSE PRACTITIONER

## 2023-06-02 PROCEDURE — 99213 OFFICE O/P EST LOW 20 MIN: CPT | Mod: 95,,, | Performed by: NURSE PRACTITIONER

## 2023-06-02 PROCEDURE — 3008F PR BODY MASS INDEX (BMI) DOCUMENTED: ICD-10-PCS | Mod: CPTII,95,, | Performed by: NURSE PRACTITIONER

## 2023-06-02 PROCEDURE — 99213 PR OFFICE/OUTPT VISIT, EST, LEVL III, 20-29 MIN: ICD-10-PCS | Mod: 95,,, | Performed by: NURSE PRACTITIONER

## 2023-06-02 PROCEDURE — 1160F RVW MEDS BY RX/DR IN RCRD: CPT | Mod: CPTII,95,, | Performed by: NURSE PRACTITIONER

## 2023-06-02 NOTE — ASSESSMENT & PLAN NOTE
Encouraged calorie reduction and 30 minutes of exercise daily. Discussed impact of obesity on general health.  Wt Readings from Last 9 Encounters:   06/02/23 96.2 kg (212 lb)   05/11/23 100.5 kg (221 lb 9 oz)   01/19/23 95.8 kg (211 lb 3.2 oz)   11/07/22 93 kg (205 lb 0.4 oz)   08/29/22 93.3 kg (205 lb 11 oz)   08/09/22 93.6 kg (206 lb 5.6 oz)   07/05/22 92.4 kg (203 lb 9.5 oz)   12/17/21 90.8 kg (200 lb 2.8 oz)   10/27/21 92.6 kg (204 lb 2.3 oz)   Body mass index is 34.22 kg/m².

## 2023-06-02 NOTE — ASSESSMENT & PLAN NOTE
Continued management with primary care provider   On ozempic  With benefit  Hemoglobin A1C   Date Value Ref Range Status   08/09/2022 5.7 (H) 4.0 - 5.6 % Final     Comment:     ADA Screening Guidelines:  5.7-6.4%  Consistent with prediabetes  >or=6.5%  Consistent with diabetes    High levels of fetal hemoglobin interfere with the HbA1C  assay. Heterozygous hemoglobin variants (HbS, HgC, etc)do  not significantly interfere with this assay.   However, presence of multiple variants may affect accuracy.

## 2023-06-02 NOTE — ASSESSMENT & PLAN NOTE
5/26/2022, 5/27/2022 home sleep study 2 nights Mild sleep disordered breathing (AHI=7) is noted based on a 4% hypopnea desaturation criteria.  6/2/2023 order Auto CPAP 5 -20 cm   Nasal mask   [facility]: [facility]   Follow up 31-90 days from obtaining PAP therapy for IDL.

## 2023-06-13 ENCOUNTER — OFFICE VISIT (OUTPATIENT)
Dept: INTERNAL MEDICINE | Facility: CLINIC | Age: 43
End: 2023-06-13
Payer: MEDICAID

## 2023-06-13 VITALS
DIASTOLIC BLOOD PRESSURE: 72 MMHG | HEART RATE: 103 BPM | WEIGHT: 216.94 LBS | SYSTOLIC BLOOD PRESSURE: 120 MMHG | BODY MASS INDEX: 34.86 KG/M2 | OXYGEN SATURATION: 96 % | HEIGHT: 66 IN | TEMPERATURE: 98 F

## 2023-06-13 DIAGNOSIS — F41.9 CHRONIC ANXIETY: ICD-10-CM

## 2023-06-13 DIAGNOSIS — R73.03 PREDIABETES: ICD-10-CM

## 2023-06-13 DIAGNOSIS — F33.2 SEVERE EPISODE OF RECURRENT MAJOR DEPRESSIVE DISORDER, WITHOUT PSYCHOTIC FEATURES: ICD-10-CM

## 2023-06-13 DIAGNOSIS — G47.33 OBSTRUCTIVE SLEEP APNEA: ICD-10-CM

## 2023-06-13 DIAGNOSIS — E66.01 CLASS 2 SEVERE OBESITY DUE TO EXCESS CALORIES WITH SERIOUS COMORBIDITY AND BODY MASS INDEX (BMI) OF 35.0 TO 35.9 IN ADULT: ICD-10-CM

## 2023-06-13 DIAGNOSIS — F31.4 BIPOLAR DISORDER WITH SEVERE DEPRESSION: Primary | ICD-10-CM

## 2023-06-13 PROCEDURE — 99214 PR OFFICE/OUTPT VISIT, EST, LEVL IV, 30-39 MIN: ICD-10-PCS | Mod: S$PBB,,, | Performed by: NURSE PRACTITIONER

## 2023-06-13 PROCEDURE — 99999 PR PBB SHADOW E&M-EST. PATIENT-LVL III: ICD-10-PCS | Mod: PBBFAC,,, | Performed by: NURSE PRACTITIONER

## 2023-06-13 PROCEDURE — 3078F PR MOST RECENT DIASTOLIC BLOOD PRESSURE < 80 MM HG: ICD-10-PCS | Mod: CPTII,,, | Performed by: NURSE PRACTITIONER

## 2023-06-13 PROCEDURE — 99213 OFFICE O/P EST LOW 20 MIN: CPT | Mod: PBBFAC,PO | Performed by: NURSE PRACTITIONER

## 2023-06-13 PROCEDURE — 99999 PR PBB SHADOW E&M-EST. PATIENT-LVL III: CPT | Mod: PBBFAC,,, | Performed by: NURSE PRACTITIONER

## 2023-06-13 PROCEDURE — 3008F PR BODY MASS INDEX (BMI) DOCUMENTED: ICD-10-PCS | Mod: CPTII,,, | Performed by: NURSE PRACTITIONER

## 2023-06-13 PROCEDURE — 3008F BODY MASS INDEX DOCD: CPT | Mod: CPTII,,, | Performed by: NURSE PRACTITIONER

## 2023-06-13 PROCEDURE — 1160F RVW MEDS BY RX/DR IN RCRD: CPT | Mod: CPTII,,, | Performed by: NURSE PRACTITIONER

## 2023-06-13 PROCEDURE — 3074F PR MOST RECENT SYSTOLIC BLOOD PRESSURE < 130 MM HG: ICD-10-PCS | Mod: CPTII,,, | Performed by: NURSE PRACTITIONER

## 2023-06-13 PROCEDURE — 99214 OFFICE O/P EST MOD 30 MIN: CPT | Mod: S$PBB,,, | Performed by: NURSE PRACTITIONER

## 2023-06-13 PROCEDURE — 1159F MED LIST DOCD IN RCRD: CPT | Mod: CPTII,,, | Performed by: NURSE PRACTITIONER

## 2023-06-13 PROCEDURE — 3074F SYST BP LT 130 MM HG: CPT | Mod: CPTII,,, | Performed by: NURSE PRACTITIONER

## 2023-06-13 PROCEDURE — 3078F DIAST BP <80 MM HG: CPT | Mod: CPTII,,, | Performed by: NURSE PRACTITIONER

## 2023-06-13 PROCEDURE — 1159F PR MEDICATION LIST DOCUMENTED IN MEDICAL RECORD: ICD-10-PCS | Mod: CPTII,,, | Performed by: NURSE PRACTITIONER

## 2023-06-13 PROCEDURE — 1160F PR REVIEW ALL MEDS BY PRESCRIBER/CLIN PHARMACIST DOCUMENTED: ICD-10-PCS | Mod: CPTII,,, | Performed by: NURSE PRACTITIONER

## 2023-06-13 RX ORDER — SEMAGLUTIDE 0.68 MG/ML
0.5 INJECTION, SOLUTION SUBCUTANEOUS
Qty: 3 ML | Refills: 5 | Status: SHIPPED | OUTPATIENT
Start: 2023-06-13 | End: 2023-10-27

## 2023-06-13 NOTE — ASSESSMENT & PLAN NOTE
Counseled on importance of diet and exercise in order to improve overall quality of life, and reduce risk of future comorbidities.  Increasing ozempic to 0.5 mg.

## 2023-07-05 ENCOUNTER — PATIENT MESSAGE (OUTPATIENT)
Dept: INTERNAL MEDICINE | Facility: CLINIC | Age: 43
End: 2023-07-05
Payer: MEDICAID

## 2023-07-24 ENCOUNTER — PATIENT MESSAGE (OUTPATIENT)
Dept: INTERNAL MEDICINE | Facility: CLINIC | Age: 43
End: 2023-07-24
Payer: MEDICAID

## 2023-07-25 ENCOUNTER — TELEPHONE (OUTPATIENT)
Dept: PULMONOLOGY | Facility: CLINIC | Age: 43
End: 2023-07-25
Payer: MEDICAID

## 2023-07-25 NOTE — TELEPHONE ENCOUNTER
----- Message from Gin Grimes MA sent at 7/25/2023  9:07 AM CDT -----  Regarding: FW: Cpap  Need an orders for cpap machine  ----- Message -----  From: Myesha Dodd  Sent: 7/24/2023   2:01 PM CDT  To: [facility] Staff  Subject: Cpap                                             Good Afternoon,    Pt called in to [facility] looking for a cpap machine. I do not see an order placed for the machine in Epic. Can you please advise? Thanks.

## 2023-08-08 ENCOUNTER — TELEPHONE (OUTPATIENT)
Dept: PULMONOLOGY | Facility: CLINIC | Age: 43
End: 2023-08-08
Payer: MEDICAID

## 2023-08-08 NOTE — TELEPHONE ENCOUNTER
----- Message from Kenzie Bruno sent at 8/8/2023  9:14 AM CDT -----  Contact: Maegan  Patient reports they have not received their sleep apnea machine. Please call patient at [phone] to assist.

## 2023-09-02 ENCOUNTER — PATIENT MESSAGE (OUTPATIENT)
Dept: PULMONOLOGY | Facility: CLINIC | Age: 43
End: 2023-09-02
Payer: MEDICAID

## 2023-09-21 ENCOUNTER — OFFICE VISIT (OUTPATIENT)
Dept: INTERNAL MEDICINE | Facility: CLINIC | Age: 43
End: 2023-09-21
Payer: MEDICAID

## 2023-09-21 VITALS
BODY MASS INDEX: 35.4 KG/M2 | DIASTOLIC BLOOD PRESSURE: 82 MMHG | HEIGHT: 66 IN | TEMPERATURE: 100 F | WEIGHT: 220.25 LBS | OXYGEN SATURATION: 96 % | HEART RATE: 105 BPM | SYSTOLIC BLOOD PRESSURE: 110 MMHG | RESPIRATION RATE: 16 BRPM

## 2023-09-21 DIAGNOSIS — F33.2 SEVERE EPISODE OF RECURRENT MAJOR DEPRESSIVE DISORDER, WITHOUT PSYCHOTIC FEATURES: ICD-10-CM

## 2023-09-21 DIAGNOSIS — E66.01 CLASS 2 SEVERE OBESITY DUE TO EXCESS CALORIES WITH SERIOUS COMORBIDITY AND BODY MASS INDEX (BMI) OF 35.0 TO 35.9 IN ADULT: ICD-10-CM

## 2023-09-21 DIAGNOSIS — F31.4 BIPOLAR DISORDER WITH SEVERE DEPRESSION: Primary | ICD-10-CM

## 2023-09-21 PROCEDURE — 3079F PR MOST RECENT DIASTOLIC BLOOD PRESSURE 80-89 MM HG: ICD-10-PCS | Mod: CPTII,,, | Performed by: NURSE PRACTITIONER

## 2023-09-21 PROCEDURE — 1159F PR MEDICATION LIST DOCUMENTED IN MEDICAL RECORD: ICD-10-PCS | Mod: CPTII,,, | Performed by: NURSE PRACTITIONER

## 2023-09-21 PROCEDURE — 3008F PR BODY MASS INDEX (BMI) DOCUMENTED: ICD-10-PCS | Mod: CPTII,,, | Performed by: NURSE PRACTITIONER

## 2023-09-21 PROCEDURE — 1160F PR REVIEW ALL MEDS BY PRESCRIBER/CLIN PHARMACIST DOCUMENTED: ICD-10-PCS | Mod: CPTII,,, | Performed by: NURSE PRACTITIONER

## 2023-09-21 PROCEDURE — 99999 PR PBB SHADOW E&M-EST. PATIENT-LVL IV: ICD-10-PCS | Mod: PBBFAC,,, | Performed by: NURSE PRACTITIONER

## 2023-09-21 PROCEDURE — 1159F MED LIST DOCD IN RCRD: CPT | Mod: CPTII,,, | Performed by: NURSE PRACTITIONER

## 2023-09-21 PROCEDURE — 99214 PR OFFICE/OUTPT VISIT, EST, LEVL IV, 30-39 MIN: ICD-10-PCS | Mod: S$PBB,,, | Performed by: NURSE PRACTITIONER

## 2023-09-21 PROCEDURE — 3074F SYST BP LT 130 MM HG: CPT | Mod: CPTII,,, | Performed by: NURSE PRACTITIONER

## 2023-09-21 PROCEDURE — 99999 PR PBB SHADOW E&M-EST. PATIENT-LVL IV: CPT | Mod: PBBFAC,,, | Performed by: NURSE PRACTITIONER

## 2023-09-21 PROCEDURE — 99214 OFFICE O/P EST MOD 30 MIN: CPT | Mod: S$PBB,,, | Performed by: NURSE PRACTITIONER

## 2023-09-21 PROCEDURE — 1160F RVW MEDS BY RX/DR IN RCRD: CPT | Mod: CPTII,,, | Performed by: NURSE PRACTITIONER

## 2023-09-21 PROCEDURE — 3008F BODY MASS INDEX DOCD: CPT | Mod: CPTII,,, | Performed by: NURSE PRACTITIONER

## 2023-09-21 PROCEDURE — 3074F PR MOST RECENT SYSTOLIC BLOOD PRESSURE < 130 MM HG: ICD-10-PCS | Mod: CPTII,,, | Performed by: NURSE PRACTITIONER

## 2023-09-21 PROCEDURE — 3079F DIAST BP 80-89 MM HG: CPT | Mod: CPTII,,, | Performed by: NURSE PRACTITIONER

## 2023-09-21 PROCEDURE — 99214 OFFICE O/P EST MOD 30 MIN: CPT | Mod: PBBFAC,PO | Performed by: NURSE PRACTITIONER

## 2023-09-21 RX ORDER — LAMOTRIGINE 25 MG/1
25 TABLET ORAL DAILY
Qty: 30 TABLET | Refills: 0 | Status: SHIPPED | OUTPATIENT
Start: 2023-09-21 | End: 2023-10-18

## 2023-09-21 NOTE — PROGRESS NOTES
Subjective:       Patient ID: Maegan Lafleur is a 42 y.o. female.    Chief Complaint: Depression (Ongoing for 14-15 years States current medications not working)    Mrs. Lafleur presents to visit for depression follow up. Does not feel that medication is helping any longer. Recently walked off of her third job in the past year due to feeling overwhelmed. Did try counselor, but did not care for her. Feels like she just wants to give up and lay in bed all day. Denies SI. Even has to force herself to do usual daily hygiene.    Depression  Visit Type: follow-up  Patient presents with the following symptoms: anhedonia, decreased concentration, depressed mood, excessive worry, fatigue, feelings of hopelessness, feelings of worthlessness, hypersomnia, irritability, malaise, nervousness/anxiety and thoughts of death.  Patient is not experiencing: chest pain, dizziness, dry mouth, palpitations, shortness of breath, suicidal ideas and suicidal planning.  Frequency of symptoms: constantly   Severity: severe   Compliance with medications:  %        Patient Active Problem List   Diagnosis    Chronic anxiety    Class 2 severe obesity due to excess calories with serious comorbidity and body mass index (BMI) of 35.0 to 35.9 in adult    Severe episode of recurrent major depressive disorder, without psychotic features    Bipolar disorder with severe depression    Fatigue    Prediabetes    Snoring    Obstructive sleep apnea       Family History   Problem Relation Age of Onset    No Known Problems Mother     Diabetes Father      History reviewed. No pertinent surgical history.      Current Outpatient Medications:     clonazePAM (KLONOPIN) 0.5 MG tablet, Take 1 tablet (0.5 mg total) by mouth 2 (two) times daily as needed (severe anxiety)., Disp: 30 tablet, Rfl: 1    paroxetine (PAXIL) 40 MG tablet, Take 1 tablet (40 mg total) by mouth once daily., Disp: 30 tablet, Rfl: 5    semaglutide (OZEMPIC) 0.25 mg or 0.5 mg (2 mg/3  "mL) pen injector, Inject 0.5 mg into the skin every 7 days., Disp: 3 mL, Rfl: 5    lamoTRIgine (LAMICTAL) 25 MG tablet, Take 1 tablet (25 mg total) by mouth once daily., Disp: 30 tablet, Rfl: 0    Review of Systems   Constitutional:  Positive for activity change, appetite change, fatigue and irritability. Negative for fever.   Respiratory:  Negative for cough and shortness of breath.    Cardiovascular:  Negative for chest pain and palpitations.   Gastrointestinal:  Negative for abdominal pain, nausea and vomiting.   Neurological:  Negative for dizziness, light-headedness and headaches.   Psychiatric/Behavioral:  Positive for agitation, decreased concentration, depression, dysphoric mood and sleep disturbance. Negative for self-injury and suicidal ideas. The patient is nervous/anxious.        Objective:   /82 (BP Location: Left arm, Patient Position: Sitting, BP Method: Large (Manual))   Pulse 105   Temp 99.9 °F (37.7 °C) (Oral)   Resp 16   Ht 5' 6" (1.676 m)   Wt 99.9 kg (220 lb 3.8 oz)   LMP 09/17/2023 (Exact Date)   SpO2 96%   BMI 35.55 kg/m²      Physical Exam  Constitutional:       General: She is not in acute distress.     Appearance: Normal appearance. She is obese. She is not ill-appearing.   Cardiovascular:      Rate and Rhythm: Normal rate.      Pulses: Normal pulses.   Pulmonary:      Effort: Pulmonary effort is normal. No respiratory distress.   Skin:     General: Skin is warm and dry.      Coloration: Skin is not pale.      Findings: No erythema.   Neurological:      Mental Status: She is alert and oriented to person, place, and time.   Psychiatric:         Mood and Affect: Mood is depressed. Affect is tearful.         Behavior: Behavior is slowed. Behavior is not agitated or hyperactive. Behavior is cooperative.         Assessment & Plan     Problem List Items Addressed This Visit          Psychiatric    Severe episode of recurrent major depressive disorder, without psychotic features    " "Current Assessment & Plan     Continue current dose of paxil for now. Referral to psych again. Info given for local resources.   Trial of lamictal         Relevant Medications    lamoTRIgine (LAMICTAL) 25 MG tablet    Other Relevant Orders    Ambulatory referral/consult to Psychiatry    Bipolar disorder with severe depression - Primary    Overview     Previously tried risperidal, abilify, prozax, latuda, zoloft and vraylar. latuda helped, but was expensive and not covered.          Current Assessment & Plan     Did not like previous counselor. Information given for other psych resources in area. Needs to get established with psych for management of bipolar/depression. Has failed several medications in past. Trial of lamictal.          Relevant Medications    lamoTRIgine (LAMICTAL) 25 MG tablet    Other Relevant Orders    Ambulatory referral/consult to Psychiatry       Endocrine    Class 2 severe obesity due to excess calories with serious comorbidity and body mass index (BMI) of 35.0 to 35.9 in adult    Current Assessment & Plan     Counseled on importance of diet and exercise in order to improve overall quality of life, and reduce risk of future comorbidities.              Follow up in about 2 weeks (around 10/5/2023) for depression.          Portions of this note may have been created with voice recognition software. Occasional "wrong-word" or "sound-a-like" substitutions may have occurred due to the inherent limitations of voice recognition software. Please, read the note carefully and recognize, using context, where substitutions have occurred.       "

## 2023-10-02 NOTE — ASSESSMENT & PLAN NOTE
Did not like previous counselor. Information given for other psych resources in area. Needs to get established with psych for management of bipolar/depression. Has failed several medications in past. Trial of lamictal.

## 2023-10-02 NOTE — ASSESSMENT & PLAN NOTE
Continue current dose of paxil for now. Referral to psych again. Info given for local resources.   Trial of lamictal

## 2023-10-18 DIAGNOSIS — F31.4 BIPOLAR DISORDER WITH SEVERE DEPRESSION: ICD-10-CM

## 2023-10-18 DIAGNOSIS — F33.2 SEVERE EPISODE OF RECURRENT MAJOR DEPRESSIVE DISORDER, WITHOUT PSYCHOTIC FEATURES: ICD-10-CM

## 2023-10-18 RX ORDER — LAMOTRIGINE 25 MG/1
25 TABLET ORAL
Qty: 30 TABLET | Refills: 0 | Status: SHIPPED | OUTPATIENT
Start: 2023-10-18 | End: 2023-10-27

## 2023-10-27 ENCOUNTER — OFFICE VISIT (OUTPATIENT)
Dept: INTERNAL MEDICINE | Facility: CLINIC | Age: 43
End: 2023-10-27
Payer: MEDICAID

## 2023-10-27 VITALS
SYSTOLIC BLOOD PRESSURE: 130 MMHG | HEART RATE: 105 BPM | BODY MASS INDEX: 35.29 KG/M2 | WEIGHT: 219.56 LBS | TEMPERATURE: 98 F | DIASTOLIC BLOOD PRESSURE: 90 MMHG | HEIGHT: 66 IN | OXYGEN SATURATION: 95 %

## 2023-10-27 DIAGNOSIS — F33.2 SEVERE EPISODE OF RECURRENT MAJOR DEPRESSIVE DISORDER, WITHOUT PSYCHOTIC FEATURES: ICD-10-CM

## 2023-10-27 DIAGNOSIS — Z11.4 SCREENING FOR HIV WITHOUT PRESENCE OF RISK FACTORS: ICD-10-CM

## 2023-10-27 DIAGNOSIS — F41.9 CHRONIC ANXIETY: ICD-10-CM

## 2023-10-27 DIAGNOSIS — Z11.59 ENCOUNTER FOR HEPATITIS C SCREENING TEST FOR LOW RISK PATIENT: ICD-10-CM

## 2023-10-27 DIAGNOSIS — Z00.00 ROUTINE ADULT HEALTH MAINTENANCE: ICD-10-CM

## 2023-10-27 DIAGNOSIS — E66.01 CLASS 2 SEVERE OBESITY DUE TO EXCESS CALORIES WITH SERIOUS COMORBIDITY AND BODY MASS INDEX (BMI) OF 35.0 TO 35.9 IN ADULT: ICD-10-CM

## 2023-10-27 DIAGNOSIS — R73.03 PREDIABETES: ICD-10-CM

## 2023-10-27 DIAGNOSIS — F31.4 BIPOLAR DISORDER WITH SEVERE DEPRESSION: Primary | ICD-10-CM

## 2023-10-27 PROCEDURE — 99999 PR PBB SHADOW E&M-EST. PATIENT-LVL III: CPT | Mod: PBBFAC,,, | Performed by: NURSE PRACTITIONER

## 2023-10-27 PROCEDURE — 3044F PR MOST RECENT HEMOGLOBIN A1C LEVEL <7.0%: ICD-10-PCS | Mod: CPTII,,, | Performed by: NURSE PRACTITIONER

## 2023-10-27 PROCEDURE — 1159F MED LIST DOCD IN RCRD: CPT | Mod: CPTII,,, | Performed by: NURSE PRACTITIONER

## 2023-10-27 PROCEDURE — 3075F PR MOST RECENT SYSTOLIC BLOOD PRESS GE 130-139MM HG: ICD-10-PCS | Mod: CPTII,,, | Performed by: NURSE PRACTITIONER

## 2023-10-27 PROCEDURE — 1160F RVW MEDS BY RX/DR IN RCRD: CPT | Mod: CPTII,,, | Performed by: NURSE PRACTITIONER

## 2023-10-27 PROCEDURE — 99214 PR OFFICE/OUTPT VISIT, EST, LEVL IV, 30-39 MIN: ICD-10-PCS | Mod: S$PBB,,, | Performed by: NURSE PRACTITIONER

## 2023-10-27 PROCEDURE — 3080F PR MOST RECENT DIASTOLIC BLOOD PRESSURE >= 90 MM HG: ICD-10-PCS | Mod: CPTII,,, | Performed by: NURSE PRACTITIONER

## 2023-10-27 PROCEDURE — 3008F PR BODY MASS INDEX (BMI) DOCUMENTED: ICD-10-PCS | Mod: CPTII,,, | Performed by: NURSE PRACTITIONER

## 2023-10-27 PROCEDURE — 99214 OFFICE O/P EST MOD 30 MIN: CPT | Mod: S$PBB,,, | Performed by: NURSE PRACTITIONER

## 2023-10-27 PROCEDURE — 99999 PR PBB SHADOW E&M-EST. PATIENT-LVL III: ICD-10-PCS | Mod: PBBFAC,,, | Performed by: NURSE PRACTITIONER

## 2023-10-27 PROCEDURE — 99213 OFFICE O/P EST LOW 20 MIN: CPT | Mod: PBBFAC,PO | Performed by: NURSE PRACTITIONER

## 2023-10-27 PROCEDURE — 3008F BODY MASS INDEX DOCD: CPT | Mod: CPTII,,, | Performed by: NURSE PRACTITIONER

## 2023-10-27 PROCEDURE — 3044F HG A1C LEVEL LT 7.0%: CPT | Mod: CPTII,,, | Performed by: NURSE PRACTITIONER

## 2023-10-27 PROCEDURE — 1160F PR REVIEW ALL MEDS BY PRESCRIBER/CLIN PHARMACIST DOCUMENTED: ICD-10-PCS | Mod: CPTII,,, | Performed by: NURSE PRACTITIONER

## 2023-10-27 PROCEDURE — 3075F SYST BP GE 130 - 139MM HG: CPT | Mod: CPTII,,, | Performed by: NURSE PRACTITIONER

## 2023-10-27 PROCEDURE — 1159F PR MEDICATION LIST DOCUMENTED IN MEDICAL RECORD: ICD-10-PCS | Mod: CPTII,,, | Performed by: NURSE PRACTITIONER

## 2023-10-27 PROCEDURE — 3080F DIAST BP >= 90 MM HG: CPT | Mod: CPTII,,, | Performed by: NURSE PRACTITIONER

## 2023-10-27 RX ORDER — LAMOTRIGINE 50 MG/1
50 TABLET, EXTENDED RELEASE ORAL DAILY
Qty: 30 TABLET | Refills: 5 | Status: SHIPPED | OUTPATIENT
Start: 2023-10-27 | End: 2023-10-27

## 2023-10-27 RX ORDER — LAMOTRIGINE 25 MG/1
50 TABLET ORAL DAILY
Qty: 60 TABLET | Refills: 5 | Status: SHIPPED | OUTPATIENT
Start: 2023-10-27 | End: 2023-11-17 | Stop reason: SDUPTHER

## 2023-10-27 RX ORDER — SEMAGLUTIDE 1.34 MG/ML
1 INJECTION, SOLUTION SUBCUTANEOUS
Qty: 3 ML | Refills: 5 | Status: SHIPPED | OUTPATIENT
Start: 2023-10-27 | End: 2023-12-04 | Stop reason: RX

## 2023-11-03 ENCOUNTER — HOSPITAL ENCOUNTER (OUTPATIENT)
Dept: RADIOLOGY | Facility: HOSPITAL | Age: 43
Discharge: HOME | End: 2023-11-03
Attending: NURSE PRACTITIONER
Payer: MEDICAID

## 2023-11-03 VITALS — BODY MASS INDEX: 35.29 KG/M2 | HEIGHT: 66 IN | WEIGHT: 219.56 LBS

## 2023-11-03 DIAGNOSIS — Z12.31 BREAST CANCER SCREENING BY MAMMOGRAM: ICD-10-CM

## 2023-11-03 PROCEDURE — 77067 SCR MAMMO BI INCL CAD: CPT | Mod: TC,PO

## 2023-11-03 PROCEDURE — 77067 MAMMO DIGITAL SCREENING BILAT WITH TOMO: ICD-10-PCS | Mod: 26,,, | Performed by: RADIOLOGY

## 2023-11-03 PROCEDURE — 77063 BREAST TOMOSYNTHESIS BI: CPT | Mod: 26,,, | Performed by: RADIOLOGY

## 2023-11-03 PROCEDURE — 77067 SCR MAMMO BI INCL CAD: CPT | Mod: 26,,, | Performed by: RADIOLOGY

## 2023-11-03 PROCEDURE — 77063 MAMMO DIGITAL SCREENING BILAT WITH TOMO: ICD-10-PCS | Mod: 26,,, | Performed by: RADIOLOGY

## 2023-11-06 NOTE — ASSESSMENT & PLAN NOTE
Counseled on importance of diet and exercise in order to improve overall quality of life, and reduce risk of future comorbidities.   has been walking for exercise.

## 2023-11-06 NOTE — ASSESSMENT & PLAN NOTE
Has noticed some improvement since starting lamictal. Increasing to 50 mg. Follow up in 2-4 weeks.

## 2023-11-13 DIAGNOSIS — F31.4 BIPOLAR DISORDER WITH SEVERE DEPRESSION: ICD-10-CM

## 2023-11-13 DIAGNOSIS — F33.2 SEVERE EPISODE OF RECURRENT MAJOR DEPRESSIVE DISORDER, WITHOUT PSYCHOTIC FEATURES: ICD-10-CM

## 2023-11-13 RX ORDER — LAMOTRIGINE 25 MG/1
25 TABLET ORAL
Qty: 30 TABLET | OUTPATIENT
Start: 2023-11-13

## 2023-11-17 ENCOUNTER — PATIENT MESSAGE (OUTPATIENT)
Dept: INTERNAL MEDICINE | Facility: CLINIC | Age: 43
End: 2023-11-17
Payer: MEDICAID

## 2023-11-17 DIAGNOSIS — F33.2 SEVERE EPISODE OF RECURRENT MAJOR DEPRESSIVE DISORDER, WITHOUT PSYCHOTIC FEATURES: ICD-10-CM

## 2023-11-17 DIAGNOSIS — F31.4 BIPOLAR DISORDER WITH SEVERE DEPRESSION: ICD-10-CM

## 2023-11-17 RX ORDER — LAMOTRIGINE 25 MG/1
50 TABLET ORAL DAILY
Qty: 60 TABLET | Refills: 5 | Status: SHIPPED | OUTPATIENT
Start: 2023-11-17 | End: 2023-12-04

## 2023-12-04 ENCOUNTER — OFFICE VISIT (OUTPATIENT)
Dept: INTERNAL MEDICINE | Facility: CLINIC | Age: 43
End: 2023-12-04
Payer: MEDICAID

## 2023-12-04 VITALS
HEART RATE: 96 BPM | TEMPERATURE: 98 F | HEIGHT: 66 IN | WEIGHT: 221.56 LBS | SYSTOLIC BLOOD PRESSURE: 122 MMHG | BODY MASS INDEX: 35.61 KG/M2 | OXYGEN SATURATION: 96 % | DIASTOLIC BLOOD PRESSURE: 80 MMHG

## 2023-12-04 DIAGNOSIS — F31.4 BIPOLAR DISORDER WITH SEVERE DEPRESSION: Primary | ICD-10-CM

## 2023-12-04 DIAGNOSIS — F33.2 SEVERE EPISODE OF RECURRENT MAJOR DEPRESSIVE DISORDER, WITHOUT PSYCHOTIC FEATURES: ICD-10-CM

## 2023-12-04 PROCEDURE — 1159F PR MEDICATION LIST DOCUMENTED IN MEDICAL RECORD: ICD-10-PCS | Mod: CPTII,,, | Performed by: NURSE PRACTITIONER

## 2023-12-04 PROCEDURE — 3044F PR MOST RECENT HEMOGLOBIN A1C LEVEL <7.0%: ICD-10-PCS | Mod: CPTII,,, | Performed by: NURSE PRACTITIONER

## 2023-12-04 PROCEDURE — 3008F BODY MASS INDEX DOCD: CPT | Mod: CPTII,,, | Performed by: NURSE PRACTITIONER

## 2023-12-04 PROCEDURE — 3074F SYST BP LT 130 MM HG: CPT | Mod: CPTII,,, | Performed by: NURSE PRACTITIONER

## 2023-12-04 PROCEDURE — 3074F PR MOST RECENT SYSTOLIC BLOOD PRESSURE < 130 MM HG: ICD-10-PCS | Mod: CPTII,,, | Performed by: NURSE PRACTITIONER

## 2023-12-04 PROCEDURE — 3008F PR BODY MASS INDEX (BMI) DOCUMENTED: ICD-10-PCS | Mod: CPTII,,, | Performed by: NURSE PRACTITIONER

## 2023-12-04 PROCEDURE — 99999 PR PBB SHADOW E&M-EST. PATIENT-LVL IV: CPT | Mod: PBBFAC,,, | Performed by: NURSE PRACTITIONER

## 2023-12-04 PROCEDURE — 99999 PR PBB SHADOW E&M-EST. PATIENT-LVL IV: ICD-10-PCS | Mod: PBBFAC,,, | Performed by: NURSE PRACTITIONER

## 2023-12-04 PROCEDURE — 99214 OFFICE O/P EST MOD 30 MIN: CPT | Mod: S$PBB,,, | Performed by: NURSE PRACTITIONER

## 2023-12-04 PROCEDURE — 1160F RVW MEDS BY RX/DR IN RCRD: CPT | Mod: CPTII,,, | Performed by: NURSE PRACTITIONER

## 2023-12-04 PROCEDURE — 1160F PR REVIEW ALL MEDS BY PRESCRIBER/CLIN PHARMACIST DOCUMENTED: ICD-10-PCS | Mod: CPTII,,, | Performed by: NURSE PRACTITIONER

## 2023-12-04 PROCEDURE — 3079F PR MOST RECENT DIASTOLIC BLOOD PRESSURE 80-89 MM HG: ICD-10-PCS | Mod: CPTII,,, | Performed by: NURSE PRACTITIONER

## 2023-12-04 PROCEDURE — 3079F DIAST BP 80-89 MM HG: CPT | Mod: CPTII,,, | Performed by: NURSE PRACTITIONER

## 2023-12-04 PROCEDURE — 99214 PR OFFICE/OUTPT VISIT, EST, LEVL IV, 30-39 MIN: ICD-10-PCS | Mod: S$PBB,,, | Performed by: NURSE PRACTITIONER

## 2023-12-04 PROCEDURE — 1159F MED LIST DOCD IN RCRD: CPT | Mod: CPTII,,, | Performed by: NURSE PRACTITIONER

## 2023-12-04 PROCEDURE — 99214 OFFICE O/P EST MOD 30 MIN: CPT | Mod: PBBFAC,PO | Performed by: NURSE PRACTITIONER

## 2023-12-04 PROCEDURE — 3044F HG A1C LEVEL LT 7.0%: CPT | Mod: CPTII,,, | Performed by: NURSE PRACTITIONER

## 2023-12-04 RX ORDER — LAMOTRIGINE 100 MG/1
100 TABLET ORAL DAILY
Qty: 30 TABLET | Refills: 11 | Status: SHIPPED | OUTPATIENT
Start: 2023-12-04 | End: 2024-12-03

## 2023-12-04 RX ORDER — PAROXETINE HYDROCHLORIDE 40 MG/1
40 TABLET, FILM COATED ORAL DAILY
Qty: 90 TABLET | Refills: 3 | Status: SHIPPED | OUTPATIENT
Start: 2023-12-04

## 2023-12-04 NOTE — ASSESSMENT & PLAN NOTE
Increasing lamictal to 100 mg. Tolerating well. Follow up if negative side effects or problems with dose increase.

## 2023-12-04 NOTE — PROGRESS NOTES
Subjective:       Patient ID: Maegan Lafleur is a 43 y.o. female.    Chief Complaint: Medication Management    Mrs. Lafleur presents a visit for 1 month follow-up since starting Lamictal.  Overall has been seeing progressive improvement in depression and bipolar episodes since starting Lamictal.  Has been tolerating 50 mg well without any negative side effects.  She continues to try to increase her routine exercises.  Some days hard of than others to go through with her exercises, but has overall still improved.  She is no acute complaints today.        Patient Active Problem List   Diagnosis    Chronic anxiety    Class 2 severe obesity due to excess calories with serious comorbidity and body mass index (BMI) of 35.0 to 35.9 in adult    Severe episode of recurrent major depressive disorder, without psychotic features    Bipolar disorder with severe depression    Fatigue    Prediabetes    Snoring    Obstructive sleep apnea       Family History   Problem Relation Age of Onset    No Known Problems Mother     Diabetes Father      History reviewed. No pertinent surgical history.      Current Outpatient Medications:     clonazePAM (KLONOPIN) 0.5 MG tablet, Take 1 tablet (0.5 mg total) by mouth 2 (two) times daily as needed (severe anxiety)., Disp: 30 tablet, Rfl: 1    lamoTRIgine (LAMICTAL) 100 MG tablet, Take 1 tablet (100 mg total) by mouth once daily., Disp: 30 tablet, Rfl: 11    paroxetine (PAXIL) 40 MG tablet, Take 1 tablet (40 mg total) by mouth once daily., Disp: 90 tablet, Rfl: 3    Review of Systems   Constitutional:  Negative for appetite change, chills, fatigue and fever.   Respiratory:  Negative for shortness of breath.    Cardiovascular:  Negative for chest pain and palpitations.   Gastrointestinal:  Negative for nausea and vomiting.   Neurological:  Negative for dizziness, light-headedness and headaches.   Psychiatric/Behavioral:  Positive for dysphoric mood (improving). Negative for self-injury,  "sleep disturbance and suicidal ideas. The patient is nervous/anxious (improving).        Objective:   /80   Pulse 96   Temp 97.6 °F (36.4 °C) (Tympanic)   Ht 5' 6" (1.676 m)   Wt 100.5 kg (221 lb 9 oz)   LMP 12/04/2023   SpO2 96%   BMI 35.76 kg/m²      Physical Exam  Constitutional:       General: She is not in acute distress.     Appearance: Normal appearance. She is obese. She is not ill-appearing.   HENT:      Head: Normocephalic.   Cardiovascular:      Rate and Rhythm: Normal rate and regular rhythm.      Pulses: Normal pulses.      Heart sounds: Normal heart sounds. No murmur heard.     No friction rub. No gallop.   Pulmonary:      Effort: Pulmonary effort is normal. No respiratory distress.      Breath sounds: Normal breath sounds. No wheezing.   Skin:     General: Skin is warm and dry.      Coloration: Skin is not pale.      Findings: No erythema.   Neurological:      Mental Status: She is alert and oriented to person, place, and time.   Psychiatric:         Mood and Affect: Affect normal. Mood is anxious (mild). Mood is not depressed. Affect is not tearful.         Speech: Speech normal.         Behavior: Behavior normal. Behavior is cooperative.         Assessment & Plan     Problem List Items Addressed This Visit          Psychiatric    Severe episode of recurrent major depressive disorder, without psychotic features    Current Assessment & Plan     Improving with lamictal. Increasing to 100 mg.          Relevant Medications    paroxetine (PAXIL) 40 MG tablet    Bipolar disorder with severe depression - Primary    Overview     Previously tried risperidal, abilify, prozax, latuda, zoloft and vraylar. latuda helped, but was expensive and not covered.          Current Assessment & Plan     Increasing lamictal to 100 mg. Tolerating well. Follow up if negative side effects or problems with dose increase.          Relevant Medications    lamoTRIgine (LAMICTAL) 100 MG tablet     Patient is in the " "best mental state that I have seen patient in since establishing care. Increasing 100 mg for now.     Follow up in about 6 months (around 6/4/2024), or if symptoms worsen or fail to improve.          Portions of this note may have been created with voice recognition software. Occasional "wrong-word" or "sound-a-like" substitutions may have occurred due to the inherent limitations of voice recognition software. Please, read the note carefully and recognize, using context, where substitutions have occurred.       "

## 2024-04-03 ENCOUNTER — PATIENT MESSAGE (OUTPATIENT)
Dept: PULMONOLOGY | Facility: CLINIC | Age: 44
End: 2024-04-03
Payer: MEDICAID

## 2024-07-31 ENCOUNTER — TELEPHONE (OUTPATIENT)
Dept: INTERNAL MEDICINE | Facility: CLINIC | Age: 44
End: 2024-07-31
Payer: MEDICAID

## 2024-07-31 NOTE — TELEPHONE ENCOUNTER
----- Message from Joanne Washington sent at 7/31/2024  1:30 PM CDT -----  Contact: Maegan  .Type: Orders Request    What orders/ testing are being requested? Pt requesting a referral to psych for depression     Is there a future appointment scheduled for the patient with PCP?    When?    Would you prefer a response via [facility]? Please return call to pt at .[phone] or [phone]    Comments:

## 2024-08-05 ENCOUNTER — PATIENT MESSAGE (OUTPATIENT)
Dept: INTERNAL MEDICINE | Facility: CLINIC | Age: 44
End: 2024-08-05
Payer: MEDICAID

## 2024-08-05 DIAGNOSIS — F41.9 CHRONIC ANXIETY: ICD-10-CM

## 2024-08-05 DIAGNOSIS — F33.2 SEVERE EPISODE OF RECURRENT MAJOR DEPRESSIVE DISORDER, WITHOUT PSYCHOTIC FEATURES: ICD-10-CM

## 2024-08-05 DIAGNOSIS — F31.4 BIPOLAR DISORDER WITH SEVERE DEPRESSION: Primary | ICD-10-CM

## 2024-11-06 ENCOUNTER — TELEPHONE (OUTPATIENT)
Dept: INTERNAL MEDICINE | Facility: CLINIC | Age: 44
End: 2024-11-06
Payer: MEDICAID

## 2024-11-06 NOTE — TELEPHONE ENCOUNTER
----- Message from [employer] sent at 11/6/2024  2:43 PM CST -----  Contact: jes  Type:  Sooner Apoointment Request    Caller is requesting a sooner appointment.  Caller declined first available appointment listed below.  Caller will not accept being placed on the waitlist and is requesting a message be sent to doctor.  Name of Caller:jes  When is the first available appointment?none populating  Symptoms:wellness,ref  Would the patient rather a call back or a response via [facility]? call  Best Call Back Number:[phone]   Additional Information:

## 2024-11-07 ENCOUNTER — HOSPITAL ENCOUNTER (OUTPATIENT)
Dept: RADIOLOGY | Facility: HOSPITAL | Age: 44
Discharge: HOME | End: 2024-11-07
Attending: NURSE PRACTITIONER
Payer: MEDICAID

## 2024-11-07 ENCOUNTER — OFFICE VISIT (OUTPATIENT)
Dept: INTERNAL MEDICINE | Facility: CLINIC | Age: 44
End: 2024-11-07
Payer: MEDICAID

## 2024-11-07 VITALS
DIASTOLIC BLOOD PRESSURE: 80 MMHG | OXYGEN SATURATION: 94 % | TEMPERATURE: 97 F | WEIGHT: 220.88 LBS | SYSTOLIC BLOOD PRESSURE: 140 MMHG | BODY MASS INDEX: 35.5 KG/M2 | HEIGHT: 66 IN | HEART RATE: 122 BPM

## 2024-11-07 DIAGNOSIS — F33.2 SEVERE EPISODE OF RECURRENT MAJOR DEPRESSIVE DISORDER, WITHOUT PSYCHOTIC FEATURES: ICD-10-CM

## 2024-11-07 DIAGNOSIS — Z00.00 ROUTINE ADULT HEALTH MAINTENANCE: Primary | ICD-10-CM

## 2024-11-07 DIAGNOSIS — Z13.220 LIPID SCREENING: ICD-10-CM

## 2024-11-07 DIAGNOSIS — F31.4 BIPOLAR DISORDER WITH SEVERE DEPRESSION: ICD-10-CM

## 2024-11-07 DIAGNOSIS — F41.9 CHRONIC ANXIETY: ICD-10-CM

## 2024-11-07 DIAGNOSIS — Z13.29 THYROID DISORDER SCREEN: ICD-10-CM

## 2024-11-07 DIAGNOSIS — R73.03 PREDIABETES: ICD-10-CM

## 2024-11-07 DIAGNOSIS — E66.01 CLASS 2 SEVERE OBESITY DUE TO EXCESS CALORIES WITH SERIOUS COMORBIDITY AND BODY MASS INDEX (BMI) OF 35.0 TO 35.9 IN ADULT: ICD-10-CM

## 2024-11-07 DIAGNOSIS — R03.0 ELEVATED BLOOD PRESSURE READING IN OFFICE WITHOUT DIAGNOSIS OF HYPERTENSION: ICD-10-CM

## 2024-11-07 DIAGNOSIS — Z12.31 BREAST CANCER SCREENING BY MAMMOGRAM: ICD-10-CM

## 2024-11-07 DIAGNOSIS — E66.812 CLASS 2 SEVERE OBESITY DUE TO EXCESS CALORIES WITH SERIOUS COMORBIDITY AND BODY MASS INDEX (BMI) OF 35.0 TO 35.9 IN ADULT: ICD-10-CM

## 2024-11-07 PROCEDURE — 77067 SCR MAMMO BI INCL CAD: CPT | Mod: 26,,, | Performed by: RADIOLOGY

## 2024-11-07 PROCEDURE — 77063 BREAST TOMOSYNTHESIS BI: CPT | Mod: 26,,, | Performed by: RADIOLOGY

## 2024-11-07 PROCEDURE — 77063 BREAST TOMOSYNTHESIS BI: CPT | Mod: TC,PO

## 2024-11-07 PROCEDURE — 99999 PR PBB SHADOW E&M-EST. PATIENT-LVL III: CPT | Mod: PBBFAC,,, | Performed by: NURSE PRACTITIONER

## 2024-11-07 PROCEDURE — 99213 OFFICE O/P EST LOW 20 MIN: CPT | Mod: PBBFAC,PO | Performed by: NURSE PRACTITIONER

## 2024-11-07 NOTE — ASSESSMENT & PLAN NOTE
Currently on lamictal and paxil. Referral sent to psych with resources to different local medicaid providers for second opinion and testing per pt request.

## 2024-11-07 NOTE — PROGRESS NOTES
Subjective:       Patient ID: Maegan Lafleur is a 43 y.o. female.    Chief Complaint: Annual Exam    History of Present Illness    CHIEF COMPLAINT:  - Ms. Lafleur presents for an annual wellness visit and to discuss ongoing depression symptoms.    HPI:  Mrs. Lafleur presents to visit for annual wellness exam and labs.   Due for mammo.   Due for pap smear  Dental exam up to date.   Due for eye exam, wears glasses.   Declines flu vaccines.   No routine exercise.  Room for improvement on diet.     She complains of persistent fatigue and excessive sleepiness.  For 1.5 to 2 weeks, she has had paresthesia in her fingertips, predominantly affecting the right hand. The paresthesia is most pronounced in the second through fourth fingers, occasionally involving all fingers.    Ms. Lafleur describes severe depression symptoms significantly impacting her daily life, including difficulty with routines. Her youngest child is also exhibiting sadness. She reports improved interactions with her partner over the past 3-4 weeks, noting decreased reactivity to confrontational situations. She does not believe that she has bipolar and would like to be reevaluated for this.     Ms. Lafleur was diagnosed with ADHD in her youth but discontinued treatment. She reports worsening ADHD symptoms, potentially related to her depression, and difficulty articulating thoughts. Would like evaluation for this by new psych.     For depression treatment, the patient is currently taking Lamictal and Paxil. A referral to psychiatry was made in August, but she felt uncomfortable with the assigned provider and did not proceed with the appointment. Would like to find different provider.    Ms. Lafleur denies problems with vision (other than needing glasses), defecation, shortness of breath, chest pain, nausea, vomiting, diarrhea, abdominal pain, constipation, hematochezia, dizziness, lightheadedness, headaches, or increased thirst.  "        Patient Active Problem List   Diagnosis    Chronic anxiety    Class 2 severe obesity due to excess calories with serious comorbidity and body mass index (BMI) of 35.0 to 35.9 in adult    Severe episode of recurrent major depressive disorder, without psychotic features    Bipolar disorder with severe depression    Fatigue    Prediabetes    Snoring    Obstructive sleep apnea    Elevated blood pressure reading in office without diagnosis of hypertension         History reviewed. No pertinent surgical history.      Current Outpatient Medications:     clonazePAM (KLONOPIN) 0.5 MG tablet, Take 1 tablet (0.5 mg total) by mouth 2 (two) times daily as needed (severe anxiety)., Disp: 30 tablet, Rfl: 1    lamoTRIgine (LAMICTAL) 100 MG tablet, Take 1 tablet (100 mg total) by mouth once daily., Disp: 30 tablet, Rfl: 11    paroxetine (PAXIL) 40 MG tablet, Take 1 tablet (40 mg total) by mouth once daily., Disp: 90 tablet, Rfl: 3    Review of Systems   Constitutional:  Positive for activity change and fatigue. Negative for appetite change and fever.   Respiratory:  Negative for cough and shortness of breath.    Cardiovascular:  Negative for chest pain and palpitations.   Gastrointestinal:  Negative for abdominal pain, blood in stool, constipation, diarrhea, nausea and vomiting.   Endocrine: Negative for polydipsia, polyphagia and polyuria.   Genitourinary:  Positive for urgency (urge /stress incontinence). Negative for dysuria and frequency.   Neurological:  Negative for dizziness, syncope, light-headedness and headaches.       Objective:   BP (!) 140/80 (BP Location: Left arm, Patient Position: Sitting)   Pulse (!) 122   Temp 97.3 °F (36.3 °C) (Tympanic)   Ht 5' 6" (1.676 m)   Wt 100.2 kg (220 lb 14.4 oz)   LMP 10/30/2024   SpO2 (!) 94%   BMI 35.65 kg/m²      Physical Exam  Vitals reviewed.   Constitutional:       General: She is not in acute distress.     Appearance: Normal appearance. She is well-developed. She is " not ill-appearing or diaphoretic.   HENT:      Head: Normocephalic.      Mouth/Throat:      Mouth: Mucous membranes are moist.      Pharynx: Oropharynx is clear. No oropharyngeal exudate or posterior oropharyngeal erythema.   Eyes:      General:         Right eye: No discharge.         Left eye: No discharge.      Extraocular Movements: Extraocular movements intact.      Conjunctiva/sclera: Conjunctivae normal.      Pupils: Pupils are equal, round, and reactive to light.   Cardiovascular:      Rate and Rhythm: Normal rate and regular rhythm.      Pulses: Normal pulses.      Heart sounds: Normal heart sounds. No murmur heard.     No friction rub. No gallop.   Pulmonary:      Effort: Pulmonary effort is normal. No respiratory distress.      Breath sounds: Normal breath sounds. No rales.   Abdominal:      Palpations: Abdomen is soft.      Tenderness: There is no abdominal tenderness. There is no guarding.   Musculoskeletal:      Cervical back: Neck supple.   Skin:     General: Skin is warm and dry.      Coloration: Skin is not pale.      Findings: No erythema.   Neurological:      Mental Status: She is alert and oriented to person, place, and time.   Psychiatric:         Mood and Affect: Mood is anxious and depressed. Affect is blunt. Affect is not tearful.         Speech: Speech normal.         Behavior: Behavior is cooperative.         Assessment & Plan         11/7/2024    10:15 AM   Depression Patient Health Questionnaire   Over the last two weeks how often have you been bothered by little interest or pleasure in doing things Nearly every day   Over the last two weeks how often have you been bothered by feeling down, depressed or hopeless Several days   PHQ-2 Total Score 4   Over the last two weeks how often have you been bothered by trouble falling or staying asleep, or sleeping too much Nearly every day   Over the last two weeks how often have you been bothered by feeling tired or having little energy Nearly  every day   Over the last two weeks how often have you been bothered by a poor appetite or overeating Nearly every day   Over the last two weeks how often have you been bothered by feeling bad about yourself - or that you are a failure or have let yourself or your family down Several days   Over the last two weeks how often have you been bothered by trouble concentrating on things, such as reading the newspaper or watching television Nearly every day   Over the last two weeks how often have you been bothered by moving or speaking so slowly that other people could have noticed. Or the opposite - being so fidgety or restless that you have been moving around a lot more than usual. Not at all   Over the last two weeks how often have you been bothered by thoughts that you would be better off dead, or of hurting yourself Not at all   If you checked off any problems, how difficult have these problems made it for you to do your work, take care of things at home or get along with other people? Somewhat difficult   PHQ-9 Score 17   PHQ-9 Interpretation Moderately Severe         Problem List Items Addressed This Visit          Psychiatric    Chronic anxiety    Current Assessment & Plan     On paxil. Klonopin prn.          Severe episode of recurrent major depressive disorder, without psychotic features    Current Assessment & Plan     Currently on lamictal and paxil. Referral sent to psych with resources to different local medicaid providers for second opinion and testing per pt request.          Relevant Orders    Ambulatory referral/consult to Psychiatry    Bipolar disorder with severe depression    Overview     Previously tried risperidal, abilify, prozax, latuda, zoloft and vraylar. latuda helped, but was expensive and not covered.          Current Assessment & Plan     Continue current medication for now. Referral sent to psych for repeat testing per pt request. Did not like most recent provider that she established with.           Relevant Orders    Ambulatory referral/consult to Psychiatry       Cardiac/Vascular    Elevated blood pressure reading in office without diagnosis of hypertension    Current Assessment & Plan     BP elevated today. BP log given. Monitor at home. Discussed goal <140/90. Lifestyle modifications            Endocrine    Class 2 severe obesity due to excess calories with serious comorbidity and body mass index (BMI) of 35.0 to 35.9 in adult    Current Assessment & Plan     Counseled on importance of diet and exercise in order to improve overall quality of life, and reduce risk of future comorbidities.           Prediabetes    Current Assessment & Plan     Labs today. Lifestyle modifications.          Relevant Orders    Hemoglobin A1C     Other Visit Diagnoses       Routine adult health maintenance    -  Primary    Relevant Orders    CBC Auto Differential (Completed)    COMPREHENSIVE METABOLIC PANEL (Completed)    Hemoglobin A1C    Lipid Panel    TSH (Completed)    Lipid screening        Relevant Orders    Lipid Panel    Thyroid disorder screen        Relevant Orders    TSH (Completed)    Breast cancer screening by mammogram        Relevant Orders    Mammo Digital Screening Bilat w/ Eric              Assessment & Plan    MEDICAL DECISION MAKING:  - Assessed patient's annual wellness needs, including eye exam, dental visits, and preventive screenings  - Evaluated prediabetes status based on previous A1C results  - Assessed depression severity and treatment resistance, considering bipolar disorder as a possible diagnosis  - Reviewed current depression treatment regimen (Lamictal and Paxil)  - Evaluated blood pressure, noting borderline elevation and considering potential white coat syndrome    PATIENT EDUCATION:  - Explained the difference between situational and chronic depression, emphasizing the impact on daily life  - Discussed the stigma associated with bipolar disorder diagnosis and its relation to  "treatment-resistant depression  - Educated on the importance of home blood pressure monitoring and white coat syndrome    ACTION ITEMS/LIFESTYLE:  - Ms. Lafleur to monitor blood pressure at home 1-2 times daily for a few weeks, aiming for less than 140/90  - Recommend reducing sugar intake to manage prediabetes  - Ms. Lafleur to resume exercise routine to improve overall health and manage weight  - Recommend reducing sodium intake to help control blood pressure    MEDICATIONS:  - Continued Lamictal at current dose for depression  - Continued Paxil at current dose for depression    ORDERS:  - Mammogram ordered    REFERRALS:  - Referred to psychiatry for re-evaluation of depression and potential ADHD testing  - Referred to gynecology for Pap smear and evaluation of urinary incontinence, possible urology referral in future also.    FOLLOW UP:  - Follow up in 2 weeks for nurse visit to check blood pressure and review home monitoring log  - Contact the office if a referral is needed for [facility] primary care for psych. Resources given to [facility] [employer] [facility] also for medicaid patients.   - Ms. Lafleur to schedule Pap smear with gynecology            Follow up in about 2 weeks (around 11/21/2024), or if symptoms worsen or fail to improve, for hypertension nurse visit .          Portions of this note may have been created with voice recognition software. Occasional "wrong-word" or "sound-a-like" substitutions may have occurred due to the inherent limitations of voice recognition software. Please, read the note carefully and recognize, using context, where substitutions have occurred.       This note was generated with the assistance of ambient listening technology. Verbal consent was obtained by the patient and accompanying visitor(s) for the recording of patient appointment to facilitate this note. I attest to having reviewed and edited the generated note for accuracy, though some syntax or spelling errors may persist. " Please contact the author of this note for any clarification.       I have reviewed the positive depression score which warrants active treatment with psychotherapy and/or medications.

## 2024-11-07 NOTE — ASSESSMENT & PLAN NOTE
Continue current medication for now. Referral sent to psych for repeat testing per pt request. Did not like most recent provider that she established with.

## 2024-11-08 ENCOUNTER — TELEPHONE (OUTPATIENT)
Dept: RADIOLOGY | Facility: HOSPITAL | Age: 44
End: 2024-11-08
Payer: MEDICAID

## 2024-11-08 DIAGNOSIS — D50.9 MICROCYTIC ANEMIA: ICD-10-CM

## 2024-11-08 DIAGNOSIS — R79.89 ELEVATED LFTS: Primary | ICD-10-CM

## 2024-11-15 ENCOUNTER — HOSPITAL ENCOUNTER (OUTPATIENT)
Dept: RADIOLOGY | Facility: HOSPITAL | Age: 44
Discharge: HOME | End: 2024-11-15
Attending: NURSE PRACTITIONER
Payer: MEDICAID

## 2024-11-15 ENCOUNTER — TELEPHONE (OUTPATIENT)
Dept: RADIOLOGY | Facility: HOSPITAL | Age: 44
End: 2024-11-15

## 2024-11-15 DIAGNOSIS — R92.8 ABNORMAL MAMMOGRAM: ICD-10-CM

## 2024-11-15 DIAGNOSIS — R92.8 ABNORMAL MAMMOGRAM: Primary | ICD-10-CM

## 2024-11-15 PROCEDURE — 77065 DX MAMMO INCL CAD UNI: CPT | Mod: 26,LT,, | Performed by: STUDENT IN AN ORGANIZED HEALTH CARE EDUCATION/TRAINING PROGRAM

## 2024-11-15 PROCEDURE — 76642 ULTRASOUND BREAST LIMITED: CPT | Mod: 26,LT,, | Performed by: STUDENT IN AN ORGANIZED HEALTH CARE EDUCATION/TRAINING PROGRAM

## 2024-11-15 PROCEDURE — 76642 ULTRASOUND BREAST LIMITED: CPT | Mod: TC,LT

## 2024-11-15 PROCEDURE — 77061 BREAST TOMOSYNTHESIS UNI: CPT | Mod: TC,LT

## 2024-11-15 PROCEDURE — 77061 BREAST TOMOSYNTHESIS UNI: CPT | Mod: 26,LT,, | Performed by: STUDENT IN AN ORGANIZED HEALTH CARE EDUCATION/TRAINING PROGRAM

## 2024-11-15 NOTE — TELEPHONE ENCOUNTER
Ultrasound guided biopsy scheduled for 11/21/24 at 8:30am, arrival time 8am. Biopsy instructions given with understandings verbalized. Patient has my contact information.

## 2024-11-21 ENCOUNTER — HOSPITAL ENCOUNTER (OUTPATIENT)
Dept: RADIOLOGY | Facility: HOSPITAL | Age: 44
Discharge: HOME | End: 2024-11-21
Attending: NURSE PRACTITIONER
Payer: MEDICAID

## 2024-11-21 DIAGNOSIS — R92.8 ABNORMAL MAMMOGRAM: ICD-10-CM

## 2024-11-21 PROCEDURE — 77065 DX MAMMO INCL CAD UNI: CPT | Mod: TC,LT

## 2024-11-21 PROCEDURE — 77065 DX MAMMO INCL CAD UNI: CPT | Mod: 26,LT,, | Performed by: RADIOLOGY

## 2024-11-21 PROCEDURE — 27200940 US BIOPSY LYMPH NODE AXILLA

## 2024-11-21 NOTE — NURSING
Pressure held on left axilla biopsy site for 10 mins, hemostasis was achieved, steri strips were applied, and wound was covered with 4x4 guaze and a tegaderm.  Dressing clean, dry and intact with no drainage noted.  Discharge instructions given verbally and in writing, patient voiced understandings.  Patient discharged and accompanied by family member.

## 2024-12-03 ENCOUNTER — TELEPHONE (OUTPATIENT)
Dept: INTERNAL MEDICINE | Facility: CLINIC | Age: 44
End: 2024-12-03
Payer: MEDICAID

## 2024-12-03 NOTE — TELEPHONE ENCOUNTER
----- Message from Ady sent at 12/3/2024  1:52 PM CST -----  Contact: [phone]  Type:  Patient Returning Call    Who Called:JAIDEN HERNÁNDEZ [[id number]]  Who Left Message for Patient:  Does the patient know what this is regarding?:patient is calling about her results 11/21  Would the patient rather a call back or a response via [facility]? Call back  Best Call Back Number: [phone]  Additional Information: n [phone]

## 2024-12-05 ENCOUNTER — TELEPHONE (OUTPATIENT)
Dept: RADIOLOGY | Facility: HOSPITAL | Age: 44
End: 2024-12-05

## 2024-12-05 NOTE — TELEPHONE ENCOUNTER
Spoke to pathology department regarding patients biopsy results, was informed that biopsy slides were sent to the [facility] on 12/4/24 for further testing. Patient notified and questions were answered. Patient has my contact information.

## 2024-12-17 ENCOUNTER — TELEPHONE (OUTPATIENT)
Dept: SURGICAL ONCOLOGY | Facility: CLINIC | Age: 44
End: 2024-12-17
Payer: MEDICAID

## 2024-12-17 ENCOUNTER — TELEPHONE (OUTPATIENT)
Dept: HEMATOLOGY/ONCOLOGY | Facility: CLINIC | Age: 44
End: 2024-12-17
Payer: MEDICAID

## 2024-12-17 DIAGNOSIS — R59.0 LYMPHADENOPATHY, AXILLARY: Primary | ICD-10-CM

## 2024-12-17 DIAGNOSIS — R89.7 ABNORMAL BIOPSY RESULT: Primary | ICD-10-CM

## 2024-12-17 DIAGNOSIS — F33.2 SEVERE EPISODE OF RECURRENT MAJOR DEPRESSIVE DISORDER, WITHOUT PSYCHOTIC FEATURES: ICD-10-CM

## 2024-12-17 RX ORDER — PAROXETINE HYDROCHLORIDE 40 MG/1
40 TABLET, FILM COATED ORAL
Qty: 90 TABLET | Refills: 3 | Status: SHIPPED | OUTPATIENT
Start: 2024-12-17

## 2024-12-17 NOTE — TELEPHONE ENCOUNTER
called patient to review pathology from LN biopsy that resulted from [facility]. Explained results to patient. Informed patient that referral would be send to hem/onc. pt v/u.

## 2024-12-17 NOTE — TELEPHONE ENCOUNTER
Pt referred to Dr Govea from Dr Nava for further testing of pathology of uncertain origin. Contacted pt to offer next avil NP appt with Dr Govea 12/26 at 10 am at . Pt v/u

## 2024-12-17 NOTE — TELEPHONE ENCOUNTER
"Patent called to inquire about "Tumor [employer]"  She spoke with Mckay TARIQ RN Nurse Navigator and wanted to know if she had to attend Friday morning.  "

## 2024-12-18 ENCOUNTER — LAB VISIT (OUTPATIENT)
Dept: LAB | Facility: HOSPITAL | Age: 44
End: 2024-12-18
Attending: NURSE PRACTITIONER
Payer: MEDICAID

## 2024-12-18 ENCOUNTER — TELEPHONE (OUTPATIENT)
Dept: HEMATOLOGY/ONCOLOGY | Facility: CLINIC | Age: 44
End: 2024-12-18
Payer: MEDICAID

## 2024-12-18 DIAGNOSIS — R59.0 LYMPHADENOPATHY, AXILLARY: ICD-10-CM

## 2024-12-18 LAB
ALBUMIN SERPL BCP-MCNC: 3.7 G/DL (ref 3.5–5.2)
ALP SERPL-CCNC: 112 U/L (ref 40–150)
ALT SERPL W/O P-5'-P-CCNC: 68 U/L (ref 10–44)
ANION GAP SERPL CALC-SCNC: 13 MMOL/L (ref 8–16)
AST SERPL-CCNC: 40 U/L (ref 10–40)
BASOPHILS # BLD AUTO: 0.03 K/UL (ref 0–0.2)
BASOPHILS NFR BLD: 0.3 % (ref 0–1.9)
BILIRUB SERPL-MCNC: 0.4 MG/DL (ref 0.1–1)
BUN SERPL-MCNC: 8 MG/DL (ref 6–20)
CALCIUM SERPL-MCNC: 9 MG/DL (ref 8.7–10.5)
CHLORIDE SERPL-SCNC: 99 MMOL/L (ref 95–110)
CO2 SERPL-SCNC: 22 MMOL/L (ref 23–29)
CREAT SERPL-MCNC: 0.8 MG/DL (ref 0.5–1.4)
DIFFERENTIAL METHOD BLD: ABNORMAL
EOSINOPHIL # BLD AUTO: 0.3 K/UL (ref 0–0.5)
EOSINOPHIL NFR BLD: 3.3 % (ref 0–8)
ERYTHROCYTE [DISTWIDTH] IN BLOOD BY AUTOMATED COUNT: 14.7 % (ref 11.5–14.5)
EST. GFR  (NO RACE VARIABLE): >60 ML/MIN/1.73 M^2
GLUCOSE SERPL-MCNC: 176 MG/DL (ref 70–110)
HCT VFR BLD AUTO: 34.8 % (ref 37–48.5)
HGB BLD-MCNC: 11.2 G/DL (ref 12–16)
HIV 1+2 AB+HIV1 P24 AG SERPL QL IA: NEGATIVE
IGA SERPL-MCNC: 208 MG/DL (ref 40–350)
IGG SERPL-MCNC: 1300 MG/DL (ref 650–1600)
IGM SERPL-MCNC: 98 MG/DL (ref 50–300)
IMM GRANULOCYTES # BLD AUTO: 0.03 K/UL (ref 0–0.04)
IMM GRANULOCYTES NFR BLD AUTO: 0.3 % (ref 0–0.5)
IRON SERPL-MCNC: 49 UG/DL (ref 30–160)
LDH SERPL L TO P-CCNC: 160 U/L (ref 110–260)
LYMPHOCYTES # BLD AUTO: 2.8 K/UL (ref 1–4.8)
LYMPHOCYTES NFR BLD: 28.7 % (ref 18–48)
MCH RBC QN AUTO: 26.5 PG (ref 27–31)
MCHC RBC AUTO-ENTMCNC: 32.2 G/DL (ref 32–36)
MCV RBC AUTO: 82 FL (ref 82–98)
MONOCYTES # BLD AUTO: 0.5 K/UL (ref 0.3–1)
MONOCYTES NFR BLD: 5 % (ref 4–15)
NEUTROPHILS # BLD AUTO: 6.1 K/UL (ref 1.8–7.7)
NEUTROPHILS NFR BLD: 62.4 % (ref 38–73)
NRBC BLD-RTO: 0 /100 WBC
PLATELET # BLD AUTO: 461 K/UL (ref 150–450)
PMV BLD AUTO: 8.1 FL (ref 9.2–12.9)
POTASSIUM SERPL-SCNC: 3.5 MMOL/L (ref 3.5–5.1)
PROT SERPL-MCNC: 7.7 G/DL (ref 6–8.4)
RBC # BLD AUTO: 4.23 M/UL (ref 4–5.4)
SATURATED IRON: 9 % (ref 20–50)
SODIUM SERPL-SCNC: 134 MMOL/L (ref 136–145)
TOTAL IRON BINDING CAPACITY: 536 UG/DL (ref 250–450)
TRANSFERRIN SERPL-MCNC: 362 MG/DL (ref 200–375)
WBC # BLD AUTO: 9.83 K/UL (ref 3.9–12.7)

## 2024-12-18 PROCEDURE — 88184 FLOWCYTOMETRY/ TC 1 MARKER: CPT | Performed by: PATHOLOGY

## 2024-12-18 PROCEDURE — 84165 PROTEIN E-PHORESIS SERUM: CPT | Mod: 26,,, | Performed by: PATHOLOGY

## 2024-12-18 PROCEDURE — 84466 ASSAY OF TRANSFERRIN: CPT | Performed by: INTERNAL MEDICINE

## 2024-12-18 PROCEDURE — 82784 ASSAY IGA/IGD/IGG/IGM EACH: CPT | Mod: 59 | Performed by: INTERNAL MEDICINE

## 2024-12-18 PROCEDURE — 83521 IG LIGHT CHAINS FREE EACH: CPT | Mod: 59 | Performed by: INTERNAL MEDICINE

## 2024-12-18 PROCEDURE — 86704 HEP B CORE ANTIBODY TOTAL: CPT | Performed by: INTERNAL MEDICINE

## 2024-12-18 PROCEDURE — 85025 COMPLETE CBC W/AUTO DIFF WBC: CPT | Mod: PO | Performed by: INTERNAL MEDICINE

## 2024-12-18 PROCEDURE — 36415 COLL VENOUS BLD VENIPUNCTURE: CPT | Mod: PO | Performed by: INTERNAL MEDICINE

## 2024-12-18 PROCEDURE — 84165 PROTEIN E-PHORESIS SERUM: CPT | Performed by: INTERNAL MEDICINE

## 2024-12-18 PROCEDURE — 88185 FLOWCYTOMETRY/TC ADD-ON: CPT | Mod: 59 | Performed by: PATHOLOGY

## 2024-12-18 PROCEDURE — 83010 ASSAY OF HAPTOGLOBIN QUANT: CPT | Performed by: INTERNAL MEDICINE

## 2024-12-18 PROCEDURE — 80053 COMPREHEN METABOLIC PANEL: CPT | Mod: PO | Performed by: INTERNAL MEDICINE

## 2024-12-18 PROCEDURE — 80074 ACUTE HEPATITIS PANEL: CPT | Performed by: INTERNAL MEDICINE

## 2024-12-18 PROCEDURE — 83615 LACTATE (LD) (LDH) ENZYME: CPT | Mod: PO | Performed by: INTERNAL MEDICINE

## 2024-12-18 PROCEDURE — 88189 FLOWCYTOMETRY/READ 16 & >: CPT | Mod: ,,, | Performed by: PATHOLOGY

## 2024-12-18 PROCEDURE — 87389 HIV-1 AG W/HIV-1&-2 AB AG IA: CPT | Performed by: INTERNAL MEDICINE

## 2024-12-19 LAB
ALBUMIN SERPL ELPH-MCNC: 4.09 G/DL (ref 3.35–5.55)
ALPHA1 GLOB SERPL ELPH-MCNC: 0.29 G/DL (ref 0.17–0.41)
ALPHA2 GLOB SERPL ELPH-MCNC: 0.74 G/DL (ref 0.43–0.99)
B-GLOBULIN SERPL ELPH-MCNC: 1.17 G/DL (ref 0.5–1.1)
FLOW CYTOMETRY ANTIBODIES ANALYZED - BLOOD: NORMAL
FLOW CYTOMETRY COMMENT - BLOOD: NORMAL
FLOW CYTOMETRY INTERPRETATION - BLOOD: NORMAL
GAMMA GLOB SERPL ELPH-MCNC: 1.22 G/DL (ref 0.67–1.58)
HAPTOGLOB SERPL-MCNC: 165 MG/DL (ref 30–250)
HAV IGM SERPL QL IA: NORMAL
HBV CORE AB SERPL QL IA: NORMAL
HBV CORE IGM SERPL QL IA: NORMAL
HBV SURFACE AG SERPL QL IA: NORMAL
HCV AB SERPL QL IA: NORMAL
KAPPA LC SER QL IA: 2.37 MG/DL (ref 0.33–1.94)
KAPPA LC/LAMBDA SER IA: 0.99 (ref 0.26–1.65)
LAMBDA LC SER QL IA: 2.4 MG/DL (ref 0.57–2.63)
PATHOLOGIST INTERPRETATION SPE: NORMAL
PROT SERPL-MCNC: 7.5 G/DL (ref 6–8.4)

## 2024-12-24 ENCOUNTER — PATIENT MESSAGE (OUTPATIENT)
Dept: HEMATOLOGY/ONCOLOGY | Facility: CLINIC | Age: 44
End: 2024-12-24
Payer: MEDICAID

## 2024-12-24 DIAGNOSIS — F31.4 BIPOLAR DISORDER WITH SEVERE DEPRESSION: ICD-10-CM

## 2024-12-24 RX ORDER — LAMOTRIGINE 100 MG/1
100 TABLET ORAL
Qty: 30 TABLET | Refills: 11 | Status: SHIPPED | OUTPATIENT
Start: 2024-12-24

## 2024-12-27 ENCOUNTER — HOSPITAL ENCOUNTER (OUTPATIENT)
Dept: RADIOLOGY | Facility: HOSPITAL | Age: 44
Discharge: HOME | End: 2024-12-27
Attending: INTERNAL MEDICINE
Payer: MEDICAID

## 2024-12-27 DIAGNOSIS — R59.0 LYMPHADENOPATHY, AXILLARY: ICD-10-CM

## 2024-12-27 PROCEDURE — A9698 NON-RAD CONTRAST MATERIALNOC: HCPCS | Mod: PO | Performed by: INTERNAL MEDICINE

## 2024-12-27 PROCEDURE — 25500020 PHARM REV CODE 255: Mod: PO | Performed by: INTERNAL MEDICINE

## 2024-12-27 PROCEDURE — 74177 CT ABD & PELVIS W/CONTRAST: CPT | Mod: 26,,, | Performed by: RADIOLOGY

## 2024-12-27 PROCEDURE — 71260 CT THORAX DX C+: CPT | Mod: 26,,, | Performed by: RADIOLOGY

## 2024-12-27 PROCEDURE — 74177 CT ABD & PELVIS W/CONTRAST: CPT | Mod: TC,PO

## 2024-12-27 RX ADMIN — IOHEXOL 75 ML: 350 INJECTION, SOLUTION INTRAVENOUS at 12:12

## 2024-12-27 RX ADMIN — IOHEXOL 1000 ML: 12 SOLUTION ORAL at 12:12

## 2025-01-10 ENCOUNTER — OFFICE VISIT (OUTPATIENT)
Dept: HEMATOLOGY/ONCOLOGY | Facility: CLINIC | Age: 45
End: 2025-01-10
Payer: MEDICAID

## 2025-01-10 VITALS
WEIGHT: 221.56 LBS | BODY MASS INDEX: 35.61 KG/M2 | HEART RATE: 93 BPM | TEMPERATURE: 98 F | OXYGEN SATURATION: 98 % | DIASTOLIC BLOOD PRESSURE: 80 MMHG | HEIGHT: 66 IN | SYSTOLIC BLOOD PRESSURE: 130 MMHG

## 2025-01-10 DIAGNOSIS — R03.0 ELEVATED BLOOD PRESSURE READING IN OFFICE WITHOUT DIAGNOSIS OF HYPERTENSION: ICD-10-CM

## 2025-01-10 DIAGNOSIS — D50.0 IRON DEFICIENCY ANEMIA DUE TO CHRONIC BLOOD LOSS: ICD-10-CM

## 2025-01-10 DIAGNOSIS — R59.0 LYMPHADENOPATHY, AXILLARY: ICD-10-CM

## 2025-01-10 DIAGNOSIS — R89.7 ABNORMAL BIOPSY RESULT: Primary | ICD-10-CM

## 2025-01-10 DIAGNOSIS — E66.01 CLASS 2 SEVERE OBESITY DUE TO EXCESS CALORIES WITH SERIOUS COMORBIDITY AND BODY MASS INDEX (BMI) OF 35.0 TO 35.9 IN ADULT: ICD-10-CM

## 2025-01-10 DIAGNOSIS — R53.83 FATIGUE, UNSPECIFIED TYPE: ICD-10-CM

## 2025-01-10 DIAGNOSIS — E66.812 CLASS 2 SEVERE OBESITY DUE TO EXCESS CALORIES WITH SERIOUS COMORBIDITY AND BODY MASS INDEX (BMI) OF 35.0 TO 35.9 IN ADULT: ICD-10-CM

## 2025-01-10 PROCEDURE — 99999 PR PBB SHADOW E&M-EST. PATIENT-LVL IV: CPT | Mod: PBBFAC,,, | Performed by: INTERNAL MEDICINE

## 2025-01-10 PROCEDURE — 99214 OFFICE O/P EST MOD 30 MIN: CPT | Mod: PBBFAC | Performed by: INTERNAL MEDICINE

## 2025-01-10 RX ORDER — FERROUS SULFATE 325(65) MG
325 TABLET ORAL
Qty: 30 TABLET | Refills: 11 | Status: SHIPPED | OUTPATIENT
Start: 2025-01-10

## 2025-01-10 NOTE — PROGRESS NOTES
"Subjective:       Patient ID: Maegan Lafleur is a 44 y.o. female.    Chief Complaint: Results    HPI:  44-year-old female referred for abnormal lab results.  Patient had lymphadenopathy biopsy of lymph node demonstrated no evidence of underlying lymphoproliferative disorder.  After initial concerned.  In extensive staging evaluation patient returns for review increasing fatigue and weakness    No past medical history on file.  Family History   Problem Relation Name Age of Onset    No Known Problems Mother      Diabetes Father       Social History     Socioeconomic History    Marital status: Single   Tobacco Use    Smoking status: Never    Smokeless tobacco: Never   Substance and Sexual Activity    Alcohol use: Not Currently    Drug use: Not Currently    Sexual activity: Yes     Partners: Male     No past surgical history on file.    Labs:  Lab Results   Component Value Date    WBC 9.83 12/18/2024    HGB 11.2 (L) 12/18/2024    HCT 34.8 (L) 12/18/2024    MCV 82 12/18/2024     (H) 12/18/2024     BMP  Lab Results   Component Value Date     (L) 12/18/2024    K 3.5 12/18/2024    CL 99 12/18/2024    CO2 22 (L) 12/18/2024    BUN 8 12/18/2024    CREATININE 0.8 12/18/2024    CALCIUM 9.0 12/18/2024    ANIONGAP 13 12/18/2024     Lab Results   Component Value Date    ALT 68 (H) 12/18/2024    AST 40 12/18/2024    ALKPHOS 112 12/18/2024    BILITOT 0.4 12/18/2024       Lab Results   Component Value Date    IRON 49 12/18/2024    TIBC 536 (H) 12/18/2024     No results found for: "YGKSGCOB27"  No results found for: "FOLATE"  Lab Results   Component Value Date    TSH 2.280 11/07/2024         Review of Systems   Constitutional:  Positive for fatigue. Negative for activity change, appetite change, chills, diaphoresis, fever and unexpected weight change.   HENT:  Negative for congestion, dental problem, drooling, ear discharge, ear pain, facial swelling, hearing loss, mouth sores, nosebleeds, postnasal drip, rhinorrhea, " sinus pressure, sneezing, sore throat, tinnitus, trouble swallowing and voice change.    Eyes:  Negative for photophobia, pain, discharge, redness, itching and visual disturbance.   Respiratory:  Negative for cough, choking, chest tightness, shortness of breath, wheezing and stridor.    Cardiovascular:  Negative for chest pain, palpitations and leg swelling.   Gastrointestinal:  Negative for abdominal distention, abdominal pain, anal bleeding, blood in stool, constipation, diarrhea, nausea, rectal pain and vomiting.   Endocrine: Negative for cold intolerance, heat intolerance, polydipsia, polyphagia and polyuria.   Genitourinary:  Negative for decreased urine volume, difficulty urinating, dyspareunia, dysuria, enuresis, flank pain, frequency, genital sores, hematuria, menstrual problem, pelvic pain, urgency, vaginal bleeding, vaginal discharge and vaginal pain.   Musculoskeletal:  Negative for arthralgias, back pain, gait problem, joint swelling, myalgias, neck pain and neck stiffness.   Skin:  Negative for color change, pallor and rash.   Allergic/Immunologic: Negative for environmental allergies, food allergies and immunocompromised state.   Neurological:  Positive for weakness. Negative for dizziness, tremors, seizures, syncope, facial asymmetry, speech difficulty, light-headedness, numbness and headaches.   Hematological:  Negative for adenopathy. Does not bruise/bleed easily.   Psychiatric/Behavioral:  Negative for agitation, behavioral problems, confusion, decreased concentration, dysphoric mood, hallucinations, self-injury, sleep disturbance and suicidal ideas. The patient is not nervous/anxious and is not hyperactive.        Objective:      Physical Exam  Vitals reviewed. Exam conducted with a chaperone present.   Constitutional:       General: She is not in acute distress.     Appearance: Normal appearance. She is well-developed. She is obese. She is not diaphoretic.   HENT:      Head: Normocephalic and  atraumatic.      Right Ear: External ear normal.      Left Ear: External ear normal.      Nose: Nose normal.      Right Sinus: No maxillary sinus tenderness or frontal sinus tenderness.      Left Sinus: No maxillary sinus tenderness or frontal sinus tenderness.      Mouth/Throat:      Pharynx: No oropharyngeal exudate.   Eyes:      General: Lids are normal. No scleral icterus.        Right eye: No discharge.         Left eye: No discharge.      Conjunctiva/sclera: Conjunctivae normal.      Right eye: Right conjunctiva is not injected. No hemorrhage.     Left eye: Left conjunctiva is not injected. No hemorrhage.     Pupils: Pupils are equal, round, and reactive to light.   Neck:      Thyroid: No thyromegaly.      Vascular: No JVD.      Trachea: No tracheal deviation.   Cardiovascular:      Rate and Rhythm: Normal rate.   Pulmonary:      Effort: Pulmonary effort is normal. No respiratory distress.      Breath sounds: No stridor.   Chest:      Chest wall: No tenderness.      Comments: Bilateral breast exam no palpable masses  Abdominal:      General: Bowel sounds are normal. There is no distension.      Palpations: Abdomen is soft. There is no hepatomegaly, splenomegaly or mass.      Tenderness: There is no abdominal tenderness. There is no rebound.   Musculoskeletal:         General: No tenderness. Normal range of motion.      Cervical back: Normal range of motion and neck supple.   Lymphadenopathy:      Cervical: No cervical adenopathy.      Upper Body:      Right upper body: No supraclavicular adenopathy.      Left upper body: No supraclavicular adenopathy.   Skin:     General: Skin is dry.      Findings: No erythema or rash.   Neurological:      Mental Status: She is alert and oriented to person, place, and time.      Cranial Nerves: No cranial nerve deficit.      Coordination: Coordination normal.   Psychiatric:         Behavior: Behavior normal.         Thought Content: Thought content normal.         Judgment:  Judgment normal.             Assessment:      1. Abnormal biopsy result    2. Iron deficiency anemia due to chronic blood loss    3. Lymphadenopathy, axillary    4. Fatigue, unspecified type    5. Class 2 severe obesity due to excess calories with serious comorbidity and body mass index (BMI) of 35.0 to 35.9 in adult    6. Elevated blood pressure reading in office without diagnosis of hypertension           Med Onc Chart Routing      Follow up with physician . Return to in 3 months.  With CBC CMP serum iron TIBC ferritin prior   Follow up with CANDICE    Infusion scheduling note    Injection scheduling note    Labs    Imaging    Pharmacy appointment    Other referrals         Referral EGD colonoscopies              Plan:     Patient does report increasing fatigue and weakness documented iron deficiency she states her periods have not been particularly different than before.  With new onset iron deficiency would recommend EGD colonoscopies at age 44 as concurrence.  Will try oral iron supplementation and iron rich foods.  If unable to do so then would recommend intravenous iron.  Discussed implications of answered questions with her article from up-to-date followed to her on iron deficiency.  Nothing to suggest any active malignancy or lymphoma with review of imaging studies and laboratory studies accept for documented iron deficiency        Dallas Govea Jr, MD FACP

## 2025-01-10 NOTE — H&P (VIEW-ONLY)
"Subjective:       Patient ID: Maegan Lafleur is a 44 y.o. female.    Chief Complaint: Results    HPI:  44-year-old female referred for abnormal lab results.  Patient had lymphadenopathy biopsy of lymph node demonstrated no evidence of underlying lymphoproliferative disorder.  After initial concerned.  In extensive staging evaluation patient returns for review increasing fatigue and weakness    No past medical history on file.  Family History   Problem Relation Name Age of Onset    No Known Problems Mother      Diabetes Father       Social History     Socioeconomic History    Marital status: Single   Tobacco Use    Smoking status: Never    Smokeless tobacco: Never   Substance and Sexual Activity    Alcohol use: Not Currently    Drug use: Not Currently    Sexual activity: Yes     Partners: Male     No past surgical history on file.    Labs:  Lab Results   Component Value Date    WBC 9.83 12/18/2024    HGB 11.2 (L) 12/18/2024    HCT 34.8 (L) 12/18/2024    MCV 82 12/18/2024     (H) 12/18/2024     BMP  Lab Results   Component Value Date     (L) 12/18/2024    K 3.5 12/18/2024    CL 99 12/18/2024    CO2 22 (L) 12/18/2024    BUN 8 12/18/2024    CREATININE 0.8 12/18/2024    CALCIUM 9.0 12/18/2024    ANIONGAP 13 12/18/2024     Lab Results   Component Value Date    ALT 68 (H) 12/18/2024    AST 40 12/18/2024    ALKPHOS 112 12/18/2024    BILITOT 0.4 12/18/2024       Lab Results   Component Value Date    IRON 49 12/18/2024    TIBC 536 (H) 12/18/2024     No results found for: "GNDDDJII43"  No results found for: "FOLATE"  Lab Results   Component Value Date    TSH 2.280 11/07/2024         Review of Systems   Constitutional:  Positive for fatigue. Negative for activity change, appetite change, chills, diaphoresis, fever and unexpected weight change.   HENT:  Negative for congestion, dental problem, drooling, ear discharge, ear pain, facial swelling, hearing loss, mouth sores, nosebleeds, postnasal drip, rhinorrhea, " sinus pressure, sneezing, sore throat, tinnitus, trouble swallowing and voice change.    Eyes:  Negative for photophobia, pain, discharge, redness, itching and visual disturbance.   Respiratory:  Negative for cough, choking, chest tightness, shortness of breath, wheezing and stridor.    Cardiovascular:  Negative for chest pain, palpitations and leg swelling.   Gastrointestinal:  Negative for abdominal distention, abdominal pain, anal bleeding, blood in stool, constipation, diarrhea, nausea, rectal pain and vomiting.   Endocrine: Negative for cold intolerance, heat intolerance, polydipsia, polyphagia and polyuria.   Genitourinary:  Negative for decreased urine volume, difficulty urinating, dyspareunia, dysuria, enuresis, flank pain, frequency, genital sores, hematuria, menstrual problem, pelvic pain, urgency, vaginal bleeding, vaginal discharge and vaginal pain.   Musculoskeletal:  Negative for arthralgias, back pain, gait problem, joint swelling, myalgias, neck pain and neck stiffness.   Skin:  Negative for color change, pallor and rash.   Allergic/Immunologic: Negative for environmental allergies, food allergies and immunocompromised state.   Neurological:  Positive for weakness. Negative for dizziness, tremors, seizures, syncope, facial asymmetry, speech difficulty, light-headedness, numbness and headaches.   Hematological:  Negative for adenopathy. Does not bruise/bleed easily.   Psychiatric/Behavioral:  Negative for agitation, behavioral problems, confusion, decreased concentration, dysphoric mood, hallucinations, self-injury, sleep disturbance and suicidal ideas. The patient is not nervous/anxious and is not hyperactive.        Objective:      Physical Exam  Vitals reviewed. Exam conducted with a chaperone present.   Constitutional:       General: She is not in acute distress.     Appearance: Normal appearance. She is well-developed. She is obese. She is not diaphoretic.   HENT:      Head: Normocephalic and  atraumatic.      Right Ear: External ear normal.      Left Ear: External ear normal.      Nose: Nose normal.      Right Sinus: No maxillary sinus tenderness or frontal sinus tenderness.      Left Sinus: No maxillary sinus tenderness or frontal sinus tenderness.      Mouth/Throat:      Pharynx: No oropharyngeal exudate.   Eyes:      General: Lids are normal. No scleral icterus.        Right eye: No discharge.         Left eye: No discharge.      Conjunctiva/sclera: Conjunctivae normal.      Right eye: Right conjunctiva is not injected. No hemorrhage.     Left eye: Left conjunctiva is not injected. No hemorrhage.     Pupils: Pupils are equal, round, and reactive to light.   Neck:      Thyroid: No thyromegaly.      Vascular: No JVD.      Trachea: No tracheal deviation.   Cardiovascular:      Rate and Rhythm: Normal rate.   Pulmonary:      Effort: Pulmonary effort is normal. No respiratory distress.      Breath sounds: No stridor.   Chest:      Chest wall: No tenderness.      Comments: Bilateral breast exam no palpable masses  Abdominal:      General: Bowel sounds are normal. There is no distension.      Palpations: Abdomen is soft. There is no hepatomegaly, splenomegaly or mass.      Tenderness: There is no abdominal tenderness. There is no rebound.   Musculoskeletal:         General: No tenderness. Normal range of motion.      Cervical back: Normal range of motion and neck supple.   Lymphadenopathy:      Cervical: No cervical adenopathy.      Upper Body:      Right upper body: No supraclavicular adenopathy.      Left upper body: No supraclavicular adenopathy.   Skin:     General: Skin is dry.      Findings: No erythema or rash.   Neurological:      Mental Status: She is alert and oriented to person, place, and time.      Cranial Nerves: No cranial nerve deficit.      Coordination: Coordination normal.   Psychiatric:         Behavior: Behavior normal.         Thought Content: Thought content normal.         Judgment:  Judgment normal.             Assessment:      1. Abnormal biopsy result    2. Iron deficiency anemia due to chronic blood loss    3. Lymphadenopathy, axillary    4. Fatigue, unspecified type    5. Class 2 severe obesity due to excess calories with serious comorbidity and body mass index (BMI) of 35.0 to 35.9 in adult    6. Elevated blood pressure reading in office without diagnosis of hypertension           Med Onc Chart Routing      Follow up with physician . Return to in 3 months.  With CBC CMP serum iron TIBC ferritin prior   Follow up with CANDICE    Infusion scheduling note    Injection scheduling note    Labs    Imaging    Pharmacy appointment    Other referrals         Referral EGD colonoscopies              Plan:     Patient does report increasing fatigue and weakness documented iron deficiency she states her periods have not been particularly different than before.  With new onset iron deficiency would recommend EGD colonoscopies at age 44 as concurrence.  Will try oral iron supplementation and iron rich foods.  If unable to do so then would recommend intravenous iron.  Discussed implications of answered questions with her article from up-to-date followed to her on iron deficiency.  Nothing to suggest any active malignancy or lymphoma with review of imaging studies and laboratory studies accept for documented iron deficiency        Dallas Govea Jr, MD FACP

## 2025-01-13 ENCOUNTER — TELEPHONE (OUTPATIENT)
Dept: HEMATOLOGY/ONCOLOGY | Facility: CLINIC | Age: 45
End: 2025-01-13
Payer: MEDICAID

## 2025-01-13 NOTE — TELEPHONE ENCOUNTER
"    1/10/2025    11:07 AM   DISTRESS SCREENING   Distress Score 10 - Extreme Distress   Emotional Concerns Worry or anxiety;Sadness or depression;Anger;Changes in appearance;Loneliness   Physical Concerns Sleep;Fatigue;Memory or concentration;Loss or change of physical abilities      was consulted by MD to address distress score of 10 with pt and assess other needs. Pt reported she has dealt with depression for "a long time" and is currently working to get scheduled with a counselor who she has a phone number for. Pt was very grateful for 's call and JACQUIE encouraged pt to contact  as needed. JACQUIE provided pt with  phone number. SW will remain available.  "

## 2025-01-14 ENCOUNTER — HOSPITAL ENCOUNTER (OUTPATIENT)
Dept: PREADMISSION TESTING | Facility: HOSPITAL | Age: 45
Discharge: HOME | End: 2025-01-14
Attending: INTERNAL MEDICINE
Payer: MEDICAID

## 2025-01-14 DIAGNOSIS — D50.0 IRON DEFICIENCY ANEMIA DUE TO CHRONIC BLOOD LOSS: Primary | ICD-10-CM

## 2025-01-14 RX ORDER — SODIUM, POTASSIUM,MAG SULFATES 17.5-3.13G
1 SOLUTION, RECONSTITUTED, ORAL ORAL DAILY
Qty: 1 KIT | Refills: 0 | Status: SHIPPED | OUTPATIENT
Start: 2025-01-14 | End: 2025-01-16

## 2025-01-29 ENCOUNTER — ANESTHESIA (OUTPATIENT)
Dept: ENDOSCOPY | Facility: HOSPITAL | Age: 45
End: 2025-01-29
Payer: MEDICAID

## 2025-01-29 ENCOUNTER — ANESTHESIA EVENT (OUTPATIENT)
Dept: ENDOSCOPY | Facility: HOSPITAL | Age: 45
End: 2025-01-29
Payer: MEDICAID

## 2025-01-29 ENCOUNTER — HOSPITAL ENCOUNTER (OUTPATIENT)
Dept: ENDOSCOPY | Facility: HOSPITAL | Age: 45
Discharge: HOME | End: 2025-01-29
Attending: INTERNAL MEDICINE | Admitting: INTERNAL MEDICINE
Payer: MEDICAID

## 2025-01-29 VITALS
HEIGHT: 66 IN | BODY MASS INDEX: 34.72 KG/M2 | OXYGEN SATURATION: 98 % | TEMPERATURE: 98 F | RESPIRATION RATE: 18 BRPM | SYSTOLIC BLOOD PRESSURE: 147 MMHG | HEART RATE: 78 BPM | WEIGHT: 216 LBS | DIASTOLIC BLOOD PRESSURE: 89 MMHG

## 2025-01-29 DIAGNOSIS — D50.0 IRON DEFICIENCY ANEMIA DUE TO CHRONIC BLOOD LOSS: Primary | ICD-10-CM

## 2025-01-29 DIAGNOSIS — D50.0 IRON DEFICIENCY ANEMIA SECONDARY TO BLOOD LOSS (CHRONIC): ICD-10-CM

## 2025-01-29 PROCEDURE — 45378 DIAGNOSTIC COLONOSCOPY: CPT | Performed by: INTERNAL MEDICINE

## 2025-01-29 PROCEDURE — 43239 EGD BIOPSY SINGLE/MULTIPLE: CPT | Mod: 59 | Performed by: INTERNAL MEDICINE

## 2025-01-29 PROCEDURE — 63600175 PHARM REV CODE 636 W HCPCS: Performed by: NURSE ANESTHETIST, CERTIFIED REGISTERED

## 2025-01-29 PROCEDURE — 43239 EGD BIOPSY SINGLE/MULTIPLE: CPT | Mod: 59,,, | Performed by: INTERNAL MEDICINE

## 2025-01-29 PROCEDURE — 27201012 HC FORCEPS, HOT/COLD, DISP

## 2025-01-29 PROCEDURE — 45378 DIAGNOSTIC COLONOSCOPY: CPT | Mod: ,,, | Performed by: INTERNAL MEDICINE

## 2025-01-29 PROCEDURE — 88305 TISSUE EXAM BY PATHOLOGIST: CPT | Performed by: PATHOLOGY

## 2025-01-29 PROCEDURE — 43251 EGD REMOVE LESION SNARE: CPT | Mod: 51,,, | Performed by: INTERNAL MEDICINE

## 2025-01-29 PROCEDURE — 37000009 HC ANESTHESIA EA ADD 15 MINS

## 2025-01-29 PROCEDURE — 43251 EGD REMOVE LESION SNARE: CPT | Performed by: INTERNAL MEDICINE

## 2025-01-29 PROCEDURE — 37000008 HC ANESTHESIA 1ST 15 MINUTES

## 2025-01-29 RX ORDER — MIDAZOLAM HYDROCHLORIDE 1 MG/ML
INJECTION INTRAMUSCULAR; INTRAVENOUS
Status: DISCONTINUED | OUTPATIENT
Start: 2025-01-29 | End: 2025-01-29

## 2025-01-29 RX ORDER — LIDOCAINE HYDROCHLORIDE 10 MG/ML
INJECTION, SOLUTION EPIDURAL; INFILTRATION; INTRACAUDAL; PERINEURAL
Status: DISCONTINUED | OUTPATIENT
Start: 2025-01-29 | End: 2025-01-29

## 2025-01-29 RX ORDER — PROPOFOL 10 MG/ML
VIAL (ML) INTRAVENOUS
Status: DISCONTINUED | OUTPATIENT
Start: 2025-01-29 | End: 2025-01-29

## 2025-01-29 RX ADMIN — PROPOFOL 50 MG: 10 INJECTION, EMULSION INTRAVENOUS at 12:01

## 2025-01-29 RX ADMIN — PROPOFOL 50 MG: 10 INJECTION, EMULSION INTRAVENOUS at 11:01

## 2025-01-29 RX ADMIN — LIDOCAINE HYDROCHLORIDE 50 MG: 10 SOLUTION INTRAVENOUS at 11:01

## 2025-01-29 RX ADMIN — PROPOFOL 100 MG: 10 INJECTION, EMULSION INTRAVENOUS at 11:01

## 2025-01-29 RX ADMIN — MIDAZOLAM HYDROCHLORIDE 2 MG: 1 INJECTION, SOLUTION INTRAMUSCULAR; INTRAVENOUS at 10:01

## 2025-01-29 NOTE — TRANSFER OF CARE
"Anesthesia Transfer of Care Note    Patient: Maegan Lafleur    Procedure(s) Performed: * No procedures listed *    Patient location: GI    Anesthesia Type: MAC    Transport from OR: Transported from OR on room air with adequate spontaneous ventilation    Post pain: adequate analgesia    Post assessment: no apparent anesthetic complications    Post vital signs: stable    Level of consciousness: sedated    Nausea/Vomiting: no nausea/vomiting    Complications: none    Transfer of care protocol was followed      Last vitals: Visit Vitals  /73 (BP Location: Left arm, Patient Position: Lying)   Pulse 96   Temp 37.2 °C (99 °F) (Temporal)   Resp 18   Ht 5' 6" (1.676 m)   Wt 98 kg (216 lb)   SpO2 95%   Breastfeeding No   BMI 34.86 kg/m²     "

## 2025-01-29 NOTE — INTERVAL H&P NOTE
The patient has been examined and the H&P has been reviewed:    I concur with the findings and changes have been noted since the H&P was written: referred by Dr. Govea for MANDI. Hx of heavy menstrual cycles. States periods can last 5 days with clots. Has not seen gynecology for this before. No Fhx of IBD or CRC. No blood thinners. Daughter at bedside.     The risks, benefits and alternatives of the procedure were discussed with the patient in detail. This discussion was had in the presence of her daughter and endoscopy staff. The risks include, risks of adverse reaction to sedation requiring the use of reversal agents, bleeding requiring blood transfusion, perforation requiring surgical intervention and technical failure. Other risks include aspiration leading to respiratory distress and respiratory failure resulting in endotracheal intubation and mechanical ventilation including death. If anesthesia is being utilized for this procedure, it is up to the anesthesiologist to determine airway safety including elective endotracheal intubation. Questions were answered, they agree to proceed. There was no language barriers.      Active Hospital Problems    Diagnosis  POA    *Iron deficiency anemia due to chronic blood loss [D50.0]  Yes      Resolved Hospital Problems   No resolved problems to display.

## 2025-01-29 NOTE — LETTER
January 29, 2025      [facility] ([facility])  [id number] [place] [id number]  Phone: [phone]  Fax: [phone]       Patient: Maegan Lafleur   YOB: 1980  Date of Visit: 01/29/2025    To Whom It May Concern:    Tim Lafleur  was at [facility] on 01/29/2025. The patient may return to work/school on *** {With/no:59004} restrictions. If you have any questions or concerns, or if I can be of further assistance, please do not hesitate to contact me.    Sincerely,    Marcy Wilkinson RN

## 2025-01-29 NOTE — ANESTHESIA PREPROCEDURE EVALUATION
01/29/2025  Maegan Lafleur is a 44 y.o., female.      Pre-op Assessment    I have reviewed the Patient Summary Reports.    I have reviewed the NPO Status.   I have reviewed the Medications.     Review of Systems  Anesthesia Hx:  No problems with previous Anesthesia                Hematology/Oncology:       -- Anemia:                                  Pulmonary:        Sleep Apnea                Hepatic/GI:  Bowel Prep.                   Endocrine:        Obesity / BMI > 30  Psych:  Psychiatric History  depression              Physical Exam  General: Well nourished    Airway:  Mallampati: II   Mouth Opening: Normal  TM Distance: Normal  Tongue: Normal  Neck ROM: Normal ROM    Dental:  Intact    Chest/Lungs:  Normal Respiratory Rate    Heart:  Rate: Normal  Rhythm: Regular Rhythm    Anesthesia Plan  Type of Anesthesia, risks & benefits discussed:    Anesthesia Type: MAC  Intra-op Monitoring Plan: Standard ASA Monitors  Post Op Pain Control Plan: multimodal analgesia  Induction:  IV  Informed Consent: Informed consent signed with the Patient and all parties understand the risks and agree with anesthesia plan.  All questions answered.   ASA Score: 2  Day of Surgery Review of History & Physical: H&P Update referred to the surgeon/provider.    Ready For Surgery From Anesthesia Perspective.   .

## 2025-02-03 LAB
COMMENT: NORMAL
FINAL PATHOLOGIC DIAGNOSIS: NORMAL
GROSS: NORMAL
Lab: NORMAL

## 2025-02-06 ENCOUNTER — PATIENT MESSAGE (OUTPATIENT)
Dept: GASTROENTEROLOGY | Facility: CLINIC | Age: 45
End: 2025-02-06
Payer: MEDICAID